# Patient Record
Sex: FEMALE | Race: WHITE | Employment: OTHER | URBAN - METROPOLITAN AREA
[De-identification: names, ages, dates, MRNs, and addresses within clinical notes are randomized per-mention and may not be internally consistent; named-entity substitution may affect disease eponyms.]

---

## 2017-04-21 RX ORDER — PANTOPRAZOLE SODIUM 40 MG/1
TABLET, DELAYED RELEASE ORAL
Qty: 30 TABLET | Refills: 5 | Status: SHIPPED | OUTPATIENT
Start: 2017-04-21 | End: 2017-08-17 | Stop reason: ALTCHOICE

## 2017-06-15 ENCOUNTER — TELEPHONE (OUTPATIENT)
Dept: INTERNAL MEDICINE CLINIC | Facility: CLINIC | Age: 72
End: 2017-06-15

## 2017-06-15 NOTE — TELEPHONE ENCOUNTER
Pt is due for colonoscopy. Referral is in Epic. Pt notified and states that she would like to see PCP first. AWV appt made for 7/5/17.

## 2017-06-30 PROBLEM — M85.80 OSTEOPENIA: Status: ACTIVE | Noted: 2017-06-30

## 2017-06-30 PROBLEM — R05.3 CHRONIC COUGH: Status: ACTIVE | Noted: 2017-06-30

## 2017-06-30 PROBLEM — F17.201 TOBACCO DEPENDENCE IN REMISSION: Status: ACTIVE | Noted: 2017-06-30

## 2017-06-30 PROBLEM — I77.1 TORTUOUS AORTA (HCC): Status: ACTIVE | Noted: 2017-06-30

## 2017-06-30 PROBLEM — I77.1 TORTUOUS AORTA: Status: ACTIVE | Noted: 2017-06-30

## 2017-07-05 ENCOUNTER — OFFICE VISIT (OUTPATIENT)
Dept: INTERNAL MEDICINE CLINIC | Facility: CLINIC | Age: 72
End: 2017-07-05

## 2017-07-05 VITALS
HEIGHT: 61 IN | SYSTOLIC BLOOD PRESSURE: 129 MMHG | WEIGHT: 175 LBS | DIASTOLIC BLOOD PRESSURE: 81 MMHG | HEART RATE: 73 BPM | BODY MASS INDEX: 33.04 KG/M2

## 2017-07-05 DIAGNOSIS — L57.0 ACTINIC KERATOSIS: ICD-10-CM

## 2017-07-05 DIAGNOSIS — Z12.31 SCREENING MAMMOGRAM, ENCOUNTER FOR: ICD-10-CM

## 2017-07-05 DIAGNOSIS — F17.201 TOBACCO DEPENDENCE IN REMISSION: ICD-10-CM

## 2017-07-05 DIAGNOSIS — E55.9 VITAMIN D DEFICIENCY: ICD-10-CM

## 2017-07-05 DIAGNOSIS — N60.19 DIFFUSE CYSTIC MASTOPATHY, UNSPECIFIED LATERALITY: ICD-10-CM

## 2017-07-05 DIAGNOSIS — I77.1 TORTUOUS AORTA (HCC): ICD-10-CM

## 2017-07-05 DIAGNOSIS — K21.9 GASTROESOPHAGEAL REFLUX DISEASE, ESOPHAGITIS PRESENCE NOT SPECIFIED: ICD-10-CM

## 2017-07-05 DIAGNOSIS — K58.9 IRRITABLE BOWEL SYNDROME WITHOUT DIARRHEA: ICD-10-CM

## 2017-07-05 DIAGNOSIS — I10 HYPERTENSION, BENIGN: ICD-10-CM

## 2017-07-05 DIAGNOSIS — M85.80 OSTEOPENIA, UNSPECIFIED LOCATION: ICD-10-CM

## 2017-07-05 DIAGNOSIS — M17.0 PRIMARY OSTEOARTHRITIS OF BOTH KNEES: ICD-10-CM

## 2017-07-05 DIAGNOSIS — J45.909 REACTIVE AIRWAY DISEASE WITHOUT COMPLICATION: ICD-10-CM

## 2017-07-05 DIAGNOSIS — L82.1 OTHER SEBORRHEIC KERATOSIS: ICD-10-CM

## 2017-07-05 DIAGNOSIS — Z00.00 ROUTINE GENERAL MEDICAL EXAMINATION AT A HEALTH CARE FACILITY: Primary | ICD-10-CM

## 2017-07-05 DIAGNOSIS — Z23 NEED FOR VACCINATION: ICD-10-CM

## 2017-07-05 DIAGNOSIS — Z00.00 ENCOUNTER FOR ANNUAL HEALTH EXAMINATION: ICD-10-CM

## 2017-07-05 LAB
ALBUMIN SERPL BCP-MCNC: 3.9 G/DL (ref 3.5–4.8)
ALBUMIN/GLOB SERPL: 1.6 {RATIO} (ref 1–2)
ALP SERPL-CCNC: 81 U/L (ref 32–100)
ALT SERPL-CCNC: 18 U/L (ref 14–54)
ANION GAP SERPL CALC-SCNC: 7 MMOL/L (ref 0–18)
AST SERPL-CCNC: 21 U/L (ref 15–41)
BILIRUB SERPL-MCNC: 0.5 MG/DL (ref 0.3–1.2)
BUN SERPL-MCNC: 14 MG/DL (ref 8–20)
BUN/CREAT SERPL: 19.4 (ref 10–20)
CALCIUM SERPL-MCNC: 9 MG/DL (ref 8.5–10.5)
CHLORIDE SERPL-SCNC: 106 MMOL/L (ref 95–110)
CHOLEST SERPL-MCNC: 207 MG/DL (ref 110–200)
CO2 SERPL-SCNC: 28 MMOL/L (ref 22–32)
CREAT SERPL-MCNC: 0.72 MG/DL (ref 0.5–1.5)
GLOBULIN PLAS-MCNC: 2.5 G/DL (ref 2.5–3.7)
GLUCOSE SERPL-MCNC: 97 MG/DL (ref 70–99)
HDLC SERPL-MCNC: 63 MG/DL
LDLC SERPL CALC-MCNC: 127 MG/DL (ref 0–99)
NONHDLC SERPL-MCNC: 144 MG/DL
OSMOLALITY UR CALC.SUM OF ELEC: 292 MOSM/KG (ref 275–295)
POTASSIUM SERPL-SCNC: 3.8 MMOL/L (ref 3.3–5.1)
PROT SERPL-MCNC: 6.4 G/DL (ref 5.9–8.4)
SODIUM SERPL-SCNC: 141 MMOL/L (ref 136–144)
TRIGL SERPL-MCNC: 83 MG/DL (ref 1–149)

## 2017-07-05 PROCEDURE — 36415 COLL VENOUS BLD VENIPUNCTURE: CPT | Performed by: INTERNAL MEDICINE

## 2017-07-05 PROCEDURE — 96160 PT-FOCUSED HLTH RISK ASSMT: CPT | Performed by: INTERNAL MEDICINE

## 2017-07-05 PROCEDURE — G0009 ADMIN PNEUMOCOCCAL VACCINE: HCPCS | Performed by: INTERNAL MEDICINE

## 2017-07-05 PROCEDURE — G0439 PPPS, SUBSEQ VISIT: HCPCS | Performed by: INTERNAL MEDICINE

## 2017-07-05 PROCEDURE — 90732 PPSV23 VACC 2 YRS+ SUBQ/IM: CPT | Performed by: INTERNAL MEDICINE

## 2017-07-05 RX ORDER — IBUPROFEN 800 MG
TABLET ORAL DAILY
COMMUNITY
End: 2018-09-15 | Stop reason: DRUGHIGH

## 2017-07-05 RX ORDER — MULTIVITAMIN WITH IRON
250 TABLET ORAL DAILY
Status: ON HOLD | COMMUNITY
End: 2019-09-12

## 2017-07-05 NOTE — PROGRESS NOTES
HPI:   Ashley Whittaker is a 73year old female who presents for a MA (Medicare Advantage) [address] [place] [address] (Once per calendar year).       Her last annual assessment has been over 1 year: Annual Physical due on 07/01/2017         Patient Care Team: Patient Care Te site; Postmenopausal bleeding; Torn meniscus; and Unspecified essential hypertension.     She  has a past surgical history that includes cholecystectomy; d&c after delivery; Breast biopsy (Right); other; tubal ligation; knee arthroscopy (Right); d & c (2008 Hearing Assessment  (Required for AWV/SWV)    Hearing Screening    Screening Method:  Questionnaire      I have a problem hearing over the telephone:  No I have trouble following the conversations when two or more people are talking at the same time:  No carotid bruit or JVD   Back:   Symmetric, no curvature, ROM normal, no CVA tenderness   Breast: Normal exam bilaterally, bilateral inverted nipples, no mass, + SBE   Lungs:   Clear to auscultation bilaterally, respirations unlabored   Heart:  Regular rate rechecked    Primary osteoarthritis of both knees   persistent knee pain particularly on the left, recommend that she follow-up with orthopedic  Osteopenia, unspecified location  Patient needs recheck of her bone density.   Encouraged her to continue calciu help    Taking medications as prescribed: Able without help    Are you able to afford your medications?: Yes    Hearing Problems?: No     Functional Status     Hearing Problems?: No    Vision Problems? : No    Difficulty walking?: No    Difficulty dressing LDL Annually LDL Cholesterol (mg/dL)   Date Value   09/22/2011 137 (H)        EKG - w/ Initial Preventative Physical Exam only, or if medically necessary Electrocardiogram date       Colorectal Cancer Screening      Colonoscopy Screen every 10 years Colono covered with a cut with metal- TD and TDaP Not covered by Medicare Part B No vaccine history found This may be covered with your prescription benefits, but Medicare does not cover unless Medically needed    Zoster  Not covered by Medicare Part B No vaccine

## 2017-07-05 NOTE — PATIENT INSTRUCTIONS
Lori Carroll's SCREENING SCHEDULE   Tests on this list are recommended by your physician but may not be covered, or covered at this frequency, by your insurer. Please check with your insurance carrier before scheduling to verify coverage.    PREVENTATIV more often if abnormal Colonoscopy,10 Years due on 05/29/2013 Update Health Maintenance if applicable    Flex Sigmoidoscopy Screen  Covered every 5 years No results found for this or any previous visit. No flowsheet data found.      Fecal Occult Blood   Cov (Pneumovax)  Covered Once after 65   Orders placed or performed in visit on 07/05/17  -PNEUMOCOCCAL IMM (PNEUMOVAX)    Please get once after your 65th birthday    Hepatitis B for Moderate/High Risk       No orders found for this or any previous visit.  Medi Lab or Procedure External Lab or Procedure   Diabetes Screening      HbgA1C    At Least  Annually for Diabetics No results found for: A1C No flowsheet data found.     Fasting Blood Sugar (FSB)   Patient must be diagnosed with one of the following:   • Hyper OCCULTSTOOL No flowsheet data found.      Barium Enema-   uncomfortable but covered  Covered but uncomfortable   Glaucoma Screening      Ophthalmology Visit   Covered annually for Diabetics, people with Glaucoma family history,   Americans over age 11 Hemophiliacs who received Factor VIII or IX concentrates   Clients of institutions for the mentally retarded   Persons who live in the same house as a HepB virus carrier   Homosexual men   Illicit injectable drug abusers     Tetanus Toxoid- Only covered

## 2017-07-07 LAB — 25(OH)D3 SERPL-MCNC: 24.5 NG/ML

## 2017-07-08 NOTE — PROGRESS NOTES
Labs normal, cholesterol better but the Vit D is a little low, needs to take additional 1000 units daily

## 2017-07-14 ENCOUNTER — HOSPITAL ENCOUNTER (OUTPATIENT)
Dept: BONE DENSITY | Age: 72
Discharge: HOME | End: 2017-07-14
Attending: INTERNAL MEDICINE
Payer: MEDICARE

## 2017-07-14 ENCOUNTER — HOSPITAL ENCOUNTER (OUTPATIENT)
Dept: MAMMOGRAPHY | Age: 72
Discharge: HOME | End: 2017-07-14
Attending: INTERNAL MEDICINE
Payer: MEDICARE

## 2017-07-14 DIAGNOSIS — M85.80 OSTEOPENIA, UNSPECIFIED LOCATION: ICD-10-CM

## 2017-07-14 DIAGNOSIS — Z12.31 SCREENING MAMMOGRAM, ENCOUNTER FOR: ICD-10-CM

## 2017-07-14 PROBLEM — M81.0 OSTEOPOROSIS, SENILE: Status: ACTIVE | Noted: 2017-07-14

## 2017-07-14 PROCEDURE — 77080 DXA BONE DENSITY AXIAL: CPT | Performed by: INTERNAL MEDICINE

## 2017-07-14 PROCEDURE — 77067 SCR MAMMO BI INCL CAD: CPT | Performed by: INTERNAL MEDICINE

## 2017-07-26 ENCOUNTER — OFFICE VISIT (OUTPATIENT)
Dept: INTERNAL MEDICINE CLINIC | Facility: CLINIC | Age: 72
End: 2017-07-26

## 2017-07-26 VITALS
HEIGHT: 61 IN | WEIGHT: 177 LBS | HEART RATE: 80 BPM | OXYGEN SATURATION: 95 % | TEMPERATURE: 98 F | BODY MASS INDEX: 33.42 KG/M2 | SYSTOLIC BLOOD PRESSURE: 144 MMHG | DIASTOLIC BLOOD PRESSURE: 79 MMHG

## 2017-07-26 DIAGNOSIS — T63.461A WASP STING, ACCIDENTAL OR UNINTENTIONAL, INITIAL ENCOUNTER: Primary | ICD-10-CM

## 2017-07-26 PROBLEM — Z91.030 BEE STING ALLERGY: Status: ACTIVE | Noted: 2017-07-26

## 2017-07-26 PROCEDURE — 99213 OFFICE O/P EST LOW 20 MIN: CPT | Performed by: INTERNAL MEDICINE

## 2017-07-26 PROCEDURE — G0463 HOSPITAL OUTPT CLINIC VISIT: HCPCS | Performed by: INTERNAL MEDICINE

## 2017-07-26 RX ORDER — EPINEPHRINE 0.3 MG/.3ML
0.3 INJECTION SUBCUTANEOUS ONCE
Qty: 1 EACH | Refills: 0 | Status: SHIPPED | OUTPATIENT
Start: 2017-07-26 | End: 2017-07-26

## 2017-07-26 RX ORDER — PREDNISONE 20 MG/1
40 TABLET ORAL DAILY
Qty: 6 TABLET | Refills: 0 | Status: SHIPPED | OUTPATIENT
Start: 2017-07-26 | End: 2017-07-29

## 2017-07-26 NOTE — PATIENT INSTRUCTIONS
ASSESSMENT/PLAN:   Diagnoses and all orders for this visit:    Wasp sting, accidental or unintentional, initial encounter    patient appears to have a significant reaction to the wasp sting.  Recommend short course of prednisone and should ice few times a d

## 2017-07-26 NOTE — PROGRESS NOTES
HPI:    Patient ID: Hemant Carpenter is a 73year old female. HPI  Wasp sting  Patient stung by wasp last night on her right finger. Had immediate swelling and erythema. No SOB or wheezing. She took benadryl which helped mildly.  Somewhat better today but

## 2017-08-17 ENCOUNTER — OFFICE VISIT (OUTPATIENT)
Dept: RHEUMATOLOGY | Facility: CLINIC | Age: 72
End: 2017-08-17

## 2017-08-17 VITALS
WEIGHT: 178.81 LBS | BODY MASS INDEX: 33.76 KG/M2 | DIASTOLIC BLOOD PRESSURE: 86 MMHG | HEIGHT: 61 IN | HEART RATE: 74 BPM | SYSTOLIC BLOOD PRESSURE: 154 MMHG

## 2017-08-17 DIAGNOSIS — E55.9 VITAMIN D DEFICIENCY: ICD-10-CM

## 2017-08-17 DIAGNOSIS — M81.0 OSTEOPOROSIS WITHOUT CURRENT PATHOLOGICAL FRACTURE, UNSPECIFIED OSTEOPOROSIS TYPE: Primary | ICD-10-CM

## 2017-08-17 PROCEDURE — 99204 OFFICE O/P NEW MOD 45 MIN: CPT | Performed by: INTERNAL MEDICINE

## 2017-08-17 PROCEDURE — G0463 HOSPITAL OUTPT CLINIC VISIT: HCPCS | Performed by: INTERNAL MEDICINE

## 2017-08-17 RX ORDER — ALENDRONATE SODIUM 70 MG/1
70 TABLET ORAL WEEKLY
Qty: 12 TABLET | Refills: 3 | Status: SHIPPED | OUTPATIENT
Start: 2017-08-17 | End: 2017-11-15

## 2017-08-17 RX ORDER — ERGOCALCIFEROL (VITAMIN D2) 1250 MCG
50000 CAPSULE ORAL WEEKLY
Qty: 12 CAPSULE | Refills: 0 | Status: SHIPPED | OUTPATIENT
Start: 2017-08-17 | End: 2017-11-15

## 2017-08-17 NOTE — PROGRESS NOTES
Migel Fillers is a 73year old female who presents for Patient presents with:  Osteoporosis  Osteoarthritis  . HPI:     I had the pleasure of seeing Migel Fillers on 8/17/2017 for evaluation.      She is a pleasant 73year old who has a hx of osteoprosi generalized or localized, unspecified site    • Osteoporosis, senile 7/14/2017   • Postmenopausal bleeding    • Torn meniscus    • Unspecified essential hypertension       Past Surgical History:  No date: APPENDECTOMY  No date: BREAST BIOPSY Right  No date DM  Joint/Muscluskeltal: see HPI, left knee arthritis noted. She broke her toe a long time ago, No other fractures   All other ROS are negative.      EXAM:   /86 (BP Location: Right arm, Patient Position: Sitting, Cuff Size: large)   Pulse 74   Ht 5 LEFT TOTAL HIP       BMD:  0.805 gm/sq. cm.    T SCORE: -1.1      Z SCORE: 0.0     PA LUMBAR SPINE (L1 - L4)       BMD:  0.966 gm/sq. cm.    T SCORE: -0.7      Z SCORE: 0.9    7/14/2017 DEXA  LEFT FEMORAL NECK                         BMD:              0. of copd but no steroids use. Summary:  1. Alendronate 70mg a week   - info on alendronate  2. Ergocalciferol 50,000units a week x 12 weeks.    3. Return to clinic in 1-2  Years or earlier depending on repsonse or if any side effects with alendronate   4

## 2017-08-17 NOTE — PATIENT INSTRUCTIONS
1. Alendronate 70mg a week   - info on alendronate  2. Ergocalciferol 50,000units a week x 12 weeks. 3. Return to clinic in 1-2  Years . 4. Check dexa scan in 2 years. 5. Cont.  Weight bearing exercises - like walking the dog -   Alendronate weekly ta Side effects that usually do not require medical attention (report to your doctor or health care professional if they continue or are bothersome):  · changes in taste  · diarrhea or constipation  · eye pain or itching  · headache  · nausea or vomiting  · s You should make sure you get enough calcium and vitamin D while you are taking this medicine, unless your doctor tells you not to.  Discuss the foods you eat and the vitamins you take with your health care professional.  Some people who take this medicine h · jaw pain, especially after dental work  · mouth sores  · muscle cramps, stiffness, or weakness  · redness, blistering, peeling or loosening of the skin, including inside the mouth  · trouble passing urine or change in the amount of urine  Side effects th Visit your doctor or health care professional for regular checkups. It may be some time before you see the benefit from this medicine. Do not stop taking your medicine unless your doctor tells you to.  Your doctor may order blood tests or other tests to see

## 2017-08-18 ENCOUNTER — TELEPHONE (OUTPATIENT)
Dept: INTERNAL MEDICINE CLINIC | Facility: CLINIC | Age: 72
End: 2017-08-18

## 2017-09-18 ENCOUNTER — TELEPHONE (OUTPATIENT)
Dept: INTERNAL MEDICINE CLINIC | Facility: CLINIC | Age: 72
End: 2017-09-18

## 2017-10-12 ENCOUNTER — TELEPHONE (OUTPATIENT)
Dept: INTERNAL MEDICINE CLINIC | Facility: CLINIC | Age: 72
End: 2017-10-12

## 2017-11-06 RX ORDER — PANTOPRAZOLE SODIUM 40 MG/1
TABLET, DELAYED RELEASE ORAL
Qty: 30 TABLET | Refills: 3 | Status: SHIPPED | OUTPATIENT
Start: 2017-11-06 | End: 2018-06-08

## 2017-11-14 ENCOUNTER — TELEPHONE (OUTPATIENT)
Dept: RHEUMATOLOGY | Facility: CLINIC | Age: 72
End: 2017-11-14

## 2017-11-14 DIAGNOSIS — E55.9 VITAMIN D DEFICIENCY: Primary | ICD-10-CM

## 2018-02-26 ENCOUNTER — PATIENT OUTREACH (OUTPATIENT)
Dept: INTERNAL MEDICINE CLINIC | Facility: CLINIC | Age: 73
End: 2018-02-26

## 2018-02-26 NOTE — PROGRESS NOTES
Patient is eligible for a 2018 Annual Medicare Health Assessment. Discussed in detail w/patient. Appt scheduled for 3/5/18.

## 2018-04-25 ENCOUNTER — OFFICE VISIT (OUTPATIENT)
Dept: OPHTHALMOLOGY | Facility: CLINIC | Age: 73
End: 2018-04-25

## 2018-04-25 DIAGNOSIS — H52.4 MYOPIA OF BOTH EYES WITH ASTIGMATISM AND PRESBYOPIA: ICD-10-CM

## 2018-04-25 DIAGNOSIS — H25.13 AGE-RELATED NUCLEAR CATARACT OF BOTH EYES: Primary | ICD-10-CM

## 2018-04-25 DIAGNOSIS — H52.203 MYOPIA OF BOTH EYES WITH ASTIGMATISM AND PRESBYOPIA: ICD-10-CM

## 2018-04-25 DIAGNOSIS — H43.393 VITREOUS FLOATERS OF BOTH EYES: ICD-10-CM

## 2018-04-25 DIAGNOSIS — H52.13 MYOPIA OF BOTH EYES WITH ASTIGMATISM AND PRESBYOPIA: ICD-10-CM

## 2018-04-25 PROCEDURE — 92015 DETERMINE REFRACTIVE STATE: CPT | Performed by: OPHTHALMOLOGY

## 2018-04-25 PROCEDURE — 92004 COMPRE OPH EXAM NEW PT 1/>: CPT | Performed by: OPHTHALMOLOGY

## 2018-04-25 NOTE — PROGRESS NOTES
Jessica Keating is a 81year old female. HPI:     HPI     New patient here for a complete exam.  Patient denies any ocular complaints, but she would like an updated Rx today. Her last eye exam was about 3 years ago.   Patient's  is a patient of  mouth daily. Disp:  Rfl:    Fluticasone Propionate HFA (FLOVENT HFA) 110 MCG/ACT Inhalation Aerosol Inhale 2 puffs into the lungs 2 (two) times daily.  (Patient taking differently: Inhale 2 puffs into the lungs as needed.  ) Disp: 1 Inhaler Rfl: 3       A 20/25    Left -9.25 +1.00 020 20/30 +2.75 20/25    Type:  Progressive bifocal                 ASSESSMENT/PLAN:     Diagnoses and Plan:     Age-related nuclear cataract of both eyes   Discussed diagnosis of cataracts in detail with patient.   Cataract surger

## 2018-04-25 NOTE — ASSESSMENT & PLAN NOTE
Discussed diagnosis of cataracts in detail with patient. Cataract surgery not indicated at this time due to vision level. Will continue to monitor yearly. Patient will call sooner than 1 year recall if they notice a change in vision.     New glasses tod

## 2018-04-25 NOTE — PATIENT INSTRUCTIONS
Age-related nuclear cataract of both eyes   Discussed diagnosis of cataracts in detail with patient. Cataract surgery not indicated at this time due to vision level. Will continue to monitor yearly.   Patient will call sooner than 1 year recall if they no that aren’t really there? A few of these flashes are seen by everyone from time to time. Usually you see them in one eye at a time.  Flashes are often caused by the gel filling inside of your eye, called the vitreous, pulling on the retina. The retina is a

## 2018-06-08 ENCOUNTER — OFFICE VISIT (OUTPATIENT)
Dept: INTERNAL MEDICINE CLINIC | Facility: CLINIC | Age: 73
End: 2018-06-08

## 2018-06-08 VITALS
BODY MASS INDEX: 34.74 KG/M2 | TEMPERATURE: 98 F | SYSTOLIC BLOOD PRESSURE: 135 MMHG | DIASTOLIC BLOOD PRESSURE: 84 MMHG | HEART RATE: 79 BPM | WEIGHT: 184 LBS | HEIGHT: 61 IN | OXYGEN SATURATION: 97 %

## 2018-06-08 DIAGNOSIS — I10 HYPERTENSION, BENIGN: Primary | ICD-10-CM

## 2018-06-08 DIAGNOSIS — M81.0 OSTEOPOROSIS, SENILE: ICD-10-CM

## 2018-06-08 DIAGNOSIS — J45.20 MILD INTERMITTENT REACTIVE AIRWAY DISEASE WITHOUT COMPLICATION: ICD-10-CM

## 2018-06-08 PROCEDURE — 99213 OFFICE O/P EST LOW 20 MIN: CPT | Performed by: INTERNAL MEDICINE

## 2018-06-08 PROCEDURE — 99212 OFFICE O/P EST SF 10 MIN: CPT | Performed by: INTERNAL MEDICINE

## 2018-06-08 RX ORDER — ALENDRONATE SODIUM 70 MG/1
70 TABLET ORAL WEEKLY
COMMUNITY
End: 2019-08-06

## 2018-06-08 RX ORDER — ACETAMINOPHEN 160 MG
2000 TABLET,DISINTEGRATING ORAL DAILY
Status: ON HOLD | COMMUNITY
End: 2019-09-12

## 2018-06-08 NOTE — PATIENT INSTRUCTIONS
ASSESSMENT/PLAN:   Hypertension, benign  BP overall doing very well continue regular exercise, same meds    Reactive airway disease without complication  This seems to be bothered only when she is mowing lawn.  Recommend that she use claritin or zyrtec prio

## 2018-06-08 NOTE — ASSESSMENT & PLAN NOTE
This seems to be bothered only when she is mowing lawn.  Recommend that she use claritin or zyrtec prior to mowing her lawn

## 2018-06-08 NOTE — PROGRESS NOTES
HPI:    Patient ID: Kassi Mckeon is a 81year old female. HPI  Hypertension  Patient is here for follow up of hypertension. BP at home: doing well Taking no medications  Exercise level: walking dog 3 times daily and has been following low salt diet. well-developed and well-nourished. HENT:   Head: Normocephalic and atraumatic. Cardiovascular: Normal rate and regular rhythm. Edema not present.   Pulmonary/Chest: Effort normal and breath sounds normal.           ASSESSMENT/PLAN:   Hypertension, b

## 2018-08-31 ENCOUNTER — OFFICE VISIT (OUTPATIENT)
Dept: INTERNAL MEDICINE CLINIC | Facility: CLINIC | Age: 73
End: 2018-08-31
Payer: COMMERCIAL

## 2018-08-31 ENCOUNTER — TELEPHONE (OUTPATIENT)
Dept: INTERNAL MEDICINE CLINIC | Facility: CLINIC | Age: 73
End: 2018-08-31

## 2018-08-31 VITALS
WEIGHT: 180 LBS | TEMPERATURE: 98 F | OXYGEN SATURATION: 98 % | HEART RATE: 74 BPM | BODY MASS INDEX: 34 KG/M2 | SYSTOLIC BLOOD PRESSURE: 131 MMHG | DIASTOLIC BLOOD PRESSURE: 82 MMHG

## 2018-08-31 DIAGNOSIS — M79.675 PAIN OF TOE OF LEFT FOOT: Primary | ICD-10-CM

## 2018-08-31 PROCEDURE — 99213 OFFICE O/P EST LOW 20 MIN: CPT | Performed by: INTERNAL MEDICINE

## 2018-08-31 NOTE — TELEPHONE ENCOUNTER
Received call from patient, stated that she tripped and fell 8 weeks ago and hurt left big toe. Per patient since then she had swelling to toe but did not think it was broken cause she could move the toe.  She also had fungal infection to same toe and treat

## 2018-08-31 NOTE — PROGRESS NOTES
HPI:    Patient ID: Manisha Rios is a 81year old female. HPI  Toe pain  Patient stubbed her toe 6/12 and had significant pain to the area. Has had pain for last 2 months. No better, no worse.  She also has fungal infection of her toe and the nail is r

## 2018-09-11 ENCOUNTER — TELEPHONE (OUTPATIENT)
Dept: CASE MANAGEMENT | Age: 73
End: 2018-09-11

## 2018-09-11 DIAGNOSIS — Z12.11 COLON CANCER SCREENING: Primary | ICD-10-CM

## 2018-09-11 NOTE — TELEPHONE ENCOUNTER
LOV 8/31/18  Patient is due for colorectal screening. (Dx: Z12.11)  Please order FIT or colonoscopy if appropriate. Thank you.

## 2018-09-13 NOTE — TELEPHONE ENCOUNTER
Patient stated Sept/Oct not good, but will have it done by the end of the year. Can you put in a referral to Dr. Clare Lerner or Dr. Amor Pederson for her.

## 2018-09-14 ENCOUNTER — TELEPHONE (OUTPATIENT)
Dept: INTERNAL MEDICINE CLINIC | Facility: CLINIC | Age: 73
End: 2018-09-14

## 2018-09-14 NOTE — TELEPHONE ENCOUNTER
Patient was stung in left side of neck 9/13 while driving. Left side of neck is red swollen and painful  Used epi pen yesterday and took benadryl   No difficulty swallowing or breathing. Anything you can suggest to put on for swelling?

## 2018-09-14 NOTE — TELEPHONE ENCOUNTER
Patient notified to apply ice to area.    Asking to be seen tomorrow   [place] Hidden 11:45 am on 9/15/18 Dr. Marcelle Fleming last patient ok per Dr. Roxie Tinoco

## 2018-09-15 ENCOUNTER — OFFICE VISIT (OUTPATIENT)
Dept: INTERNAL MEDICINE CLINIC | Facility: CLINIC | Age: 73
End: 2018-09-15
Payer: COMMERCIAL

## 2018-09-15 VITALS
BODY MASS INDEX: 33.79 KG/M2 | SYSTOLIC BLOOD PRESSURE: 126 MMHG | TEMPERATURE: 98 F | HEIGHT: 61 IN | WEIGHT: 179 LBS | OXYGEN SATURATION: 97 % | DIASTOLIC BLOOD PRESSURE: 84 MMHG | HEART RATE: 80 BPM

## 2018-09-15 DIAGNOSIS — I77.1 TORTUOUS AORTA (HCC): ICD-10-CM

## 2018-09-15 DIAGNOSIS — R21 RASH: ICD-10-CM

## 2018-09-15 DIAGNOSIS — I10 HYPERTENSION, BENIGN: Primary | ICD-10-CM

## 2018-09-15 DIAGNOSIS — T63.461A WASP STING, ACCIDENTAL OR UNINTENTIONAL, INITIAL ENCOUNTER: ICD-10-CM

## 2018-09-15 PROCEDURE — 99214 OFFICE O/P EST MOD 30 MIN: CPT | Performed by: INTERNAL MEDICINE

## 2018-09-15 RX ORDER — RANITIDINE 150 MG/1
150 TABLET ORAL 2 TIMES DAILY
Qty: 10 TABLET | Refills: 0 | Status: SHIPPED | OUTPATIENT
Start: 2018-09-15 | End: 2019-03-27 | Stop reason: ALTCHOICE

## 2018-09-15 RX ORDER — EPINEPHRINE 0.3 MG/.3ML
0.3 INJECTION SUBCUTANEOUS ONCE
Qty: 1 EACH | Refills: 0 | Status: SHIPPED | OUTPATIENT
Start: 2018-09-15 | End: 2018-09-15

## 2018-09-15 RX ORDER — METHYLPREDNISOLONE 4 MG/1
TABLET ORAL
Qty: 1 KIT | Refills: 0 | Status: SHIPPED | OUTPATIENT
Start: 2018-09-15 | End: 2019-03-27 | Stop reason: ALTCHOICE

## 2018-09-15 NOTE — PATIENT INSTRUCTIONS
ASSESSMENT/PLAN:   Hypertension, benign  (primary encounter diagnosis) Good control. Careful with diet and excercise at least 30 minutes 3-4 times a week. Check blood pressures at different times on different days. Can purchase own blood pressure monitor.

## 2018-09-15 NOTE — PROGRESS NOTES
HPI:    Patient ID: Caty Yates is a 81year old female. HPI  Stung by wasp in 2 days ago. L side neck and swollen. Took benadryl. Decreased. Maybe colonoscopy 12-18? Too busy at work to schedule.      Exercise level: moderately active and has bee Endocrine: Negative for cold intolerance, heat intolerance, polydipsia, polyphagia and polyuria. Genitourinary: Negative for dysuria. Musculoskeletal: Negative for neck stiffness. Skin: Negative for rash.    Allergic/Immunologic: Negative for environm 2001: LÁZARO NEEDLE LOCALIZATION W/ SPECIMEN 1 SITE RIGHT  No date: OTHER      Comment:  excision of left breast plate  No date: TUBAL LIGATION   Family History   Adopted: Yes   Problem Relation Age of Onset   • Cancer Brother         Lung   • Dementia Other Mouth/Throat: Uvula is midline, oropharynx is clear and moist and mucous membranes are normal. Mucous membranes are not pale, not dry and not cyanotic. She does not have dentures. No oral lesions. No trismus in the jaw.  No dental abscesses, uvula swelling Right cervical: No superficial cervical, no deep cervical and no posterior cervical adenopathy present. Left cervical: No superficial cervical, no deep cervical and no posterior cervical adenopathy present.         Right: No supraclavicular adeno

## 2018-09-18 ENCOUNTER — OFFICE VISIT (OUTPATIENT)
Dept: PODIATRY CLINIC | Facility: CLINIC | Age: 73
End: 2018-09-18
Payer: COMMERCIAL

## 2018-09-18 DIAGNOSIS — B35.1 ONYCHOMYCOSIS: ICD-10-CM

## 2018-09-18 DIAGNOSIS — M79.675 PAIN OF TOE OF LEFT FOOT: Primary | ICD-10-CM

## 2018-09-18 PROCEDURE — 11720 DEBRIDE NAIL 1-5: CPT | Performed by: PODIATRIST

## 2018-09-18 PROCEDURE — 99202 OFFICE O/P NEW SF 15 MIN: CPT | Performed by: PODIATRIST

## 2018-09-18 NOTE — PROGRESS NOTES
HPI:    Patient ID: Eusebia Chu is a 72year old female. HPI  This pleasant 16-year-old female presents as a new patient to me on referral from [address] [place] [address]. Patient's primary concern is the left great toenail.   She injured it a couple months ago an well-informed and indicates an understanding follow-up as needed         ASSESSMENT/PLAN:   Pain of toe of left foot  (primary encounter diagnosis)  Onychomycosis    No orders of the defined types were placed in this encounter.       Meds This Visit:  Reque

## 2018-12-05 ENCOUNTER — PATIENT OUTREACH (OUTPATIENT)
Dept: CASE MANAGEMENT | Age: 73
End: 2018-12-05

## 2018-12-05 NOTE — PROGRESS NOTES
Outreached to patient in regards to scheduling Medicare Annual exam (AHA). Patient scheduled her appt with her PCP for February 19, 2019. Thank you.

## 2019-02-12 ENCOUNTER — MA CHART PREP (OUTPATIENT)
Dept: FAMILY MEDICINE CLINIC | Facility: CLINIC | Age: 74
End: 2019-02-12

## 2019-03-26 ENCOUNTER — WALK IN (OUTPATIENT)
Dept: URGENT CARE | Age: 74
End: 2019-03-26

## 2019-03-26 VITALS
TEMPERATURE: 98 F | RESPIRATION RATE: 20 BRPM | DIASTOLIC BLOOD PRESSURE: 80 MMHG | SYSTOLIC BLOOD PRESSURE: 120 MMHG | HEART RATE: 79 BPM | OXYGEN SATURATION: 96 % | WEIGHT: 170.65 LBS | HEIGHT: 60 IN | BODY MASS INDEX: 33.51 KG/M2

## 2019-03-26 DIAGNOSIS — B02.8 HERPES ZOSTER WITH COMPLICATION: Primary | ICD-10-CM

## 2019-03-26 PROCEDURE — 99203 OFFICE O/P NEW LOW 30 MIN: CPT | Performed by: NURSE PRACTITIONER

## 2019-03-26 RX ORDER — FAMCICLOVIR 500 MG/1
500 TABLET ORAL 3 TIMES DAILY
Qty: 21 TABLET | Refills: 0 | Status: SHIPPED | OUTPATIENT
Start: 2019-03-26 | End: 2019-04-02

## 2019-03-26 ASSESSMENT — ENCOUNTER SYMPTOMS
NEUROLOGICAL NEGATIVE: 1
EYES NEGATIVE: 1
RESPIRATORY NEGATIVE: 1
HEMATOLOGIC/LYMPHATIC NEGATIVE: 1
PSYCHIATRIC NEGATIVE: 1
ALLERGIC/IMMUNOLOGIC NEGATIVE: 1
CONSTITUTIONAL NEGATIVE: 1
GASTROINTESTINAL NEGATIVE: 1
ENDOCRINE NEGATIVE: 1

## 2019-03-27 ENCOUNTER — OFFICE VISIT (OUTPATIENT)
Dept: INTERNAL MEDICINE CLINIC | Facility: CLINIC | Age: 74
End: 2019-03-27
Payer: COMMERCIAL

## 2019-03-27 VITALS
WEIGHT: 169 LBS | HEIGHT: 61 IN | BODY MASS INDEX: 31.91 KG/M2 | HEART RATE: 89 BPM | SYSTOLIC BLOOD PRESSURE: 127 MMHG | DIASTOLIC BLOOD PRESSURE: 83 MMHG

## 2019-03-27 DIAGNOSIS — B02.9 HERPES ZOSTER WITHOUT COMPLICATION: Primary | ICD-10-CM

## 2019-03-27 DIAGNOSIS — M54.31 SCIATICA OF RIGHT SIDE: ICD-10-CM

## 2019-03-27 PROCEDURE — 99213 OFFICE O/P EST LOW 20 MIN: CPT | Performed by: INTERNAL MEDICINE

## 2019-03-27 RX ORDER — GABAPENTIN 100 MG/1
100 CAPSULE ORAL 2 TIMES DAILY
Qty: 60 CAPSULE | Refills: 0 | Status: ON HOLD | OUTPATIENT
Start: 2019-03-27 | End: 2019-04-05

## 2019-03-27 RX ORDER — FAMCICLOVIR 500 MG/1
500 TABLET, FILM COATED ORAL 3 TIMES DAILY
Status: ON HOLD | COMMUNITY
Start: 2019-03-26 | End: 2019-04-05

## 2019-03-27 NOTE — PROGRESS NOTES
Anjali Smith is a 72year old female. Patient presents with:  Back Pain: R-side, starts at buttock and runs down to leg, onset one week ago  Rash: R-thigh, taking famciclovir    HPI:   65-year-old lady here for evaluation of right-sided back pain had a ra Use      Smoking status: Former Smoker        Types: Cigarettes      Smokeless tobacco: Never Used      Tobacco comment: occasional cigarette with friends in highschool    Alcohol use:  Yes      Alcohol/week: 0.0 oz      Comment: occasional wine    Drug use subsiding   Neurological: She is alert and oriented to person, place, and time. Psychiatric: Her behavior is normal.         ASSESSMENT AND PLAN:   1. Herpes zoster without complication  Continue with famciclovir. Discussed precautions    2.  Sciatica of

## 2019-03-27 NOTE — PATIENT INSTRUCTIONS
As we discussed, the rash in the right lower extremity is herpes zoster. It may become painful the next 24-48 hours. He can take the medicine I gave you. If your pain did not get better with the medicine, please give me a call back.   The discomfort in t

## 2019-04-01 ENCOUNTER — TELEPHONE (OUTPATIENT)
Dept: INTERNAL MEDICINE CLINIC | Facility: CLINIC | Age: 74
End: 2019-04-01

## 2019-04-01 NOTE — TELEPHONE ENCOUNTER
Please let pat take 2 tabs of gabapentin in Am and in PM . Please let pat know Thanks    Lanie Omalley

## 2019-04-02 ENCOUNTER — HOSPITAL ENCOUNTER (OUTPATIENT)
Facility: HOSPITAL | Age: 74
Setting detail: OBSERVATION
Discharge: HOME | End: 2019-04-05
Attending: EMERGENCY MEDICINE | Admitting: INTERNAL MEDICINE
Payer: MEDICARE

## 2019-04-02 ENCOUNTER — APPOINTMENT (OUTPATIENT)
Dept: CT IMAGING | Facility: HOSPITAL | Age: 74
End: 2019-04-02
Attending: EMERGENCY MEDICINE
Payer: MEDICARE

## 2019-04-02 ENCOUNTER — APPOINTMENT (OUTPATIENT)
Dept: MRI IMAGING | Facility: HOSPITAL | Age: 74
End: 2019-04-02
Attending: EMERGENCY MEDICINE
Payer: MEDICARE

## 2019-04-02 DIAGNOSIS — R10.13 ABDOMINAL PAIN, EPIGASTRIC: Primary | ICD-10-CM

## 2019-04-02 DIAGNOSIS — R00.0 TACHYCARDIA: ICD-10-CM

## 2019-04-02 DIAGNOSIS — K83.9 BILE DUCT ABNORMALITY: ICD-10-CM

## 2019-04-02 PROBLEM — R11.2 NON-INTRACTABLE VOMITING WITH NAUSEA: Status: ACTIVE | Noted: 2019-04-02

## 2019-04-02 PROBLEM — R10.11 RIGHT UPPER QUADRANT ABDOMINAL PAIN: Status: ACTIVE | Noted: 2019-04-02

## 2019-04-02 PROBLEM — Z86.19 HISTORY OF SHINGLES: Status: ACTIVE | Noted: 2019-04-02

## 2019-04-02 PROCEDURE — 74181 MRI ABDOMEN W/O CONTRAST: CPT | Performed by: EMERGENCY MEDICINE

## 2019-04-02 PROCEDURE — 76376 3D RENDER W/INTRP POSTPROCES: CPT | Performed by: EMERGENCY MEDICINE

## 2019-04-02 PROCEDURE — 99220 INITIAL OBSERVATION CARE,LEVL III: CPT | Performed by: INTERNAL MEDICINE

## 2019-04-02 PROCEDURE — 74177 CT ABD & PELVIS W/CONTRAST: CPT | Performed by: EMERGENCY MEDICINE

## 2019-04-02 RX ORDER — GABAPENTIN 100 MG/1
100 CAPSULE ORAL 2 TIMES DAILY
Status: DISCONTINUED | OUTPATIENT
Start: 2019-04-02 | End: 2019-04-04

## 2019-04-02 RX ORDER — ONDANSETRON 2 MG/ML
4 INJECTION INTRAMUSCULAR; INTRAVENOUS EVERY 6 HOURS PRN
Status: DISCONTINUED | OUTPATIENT
Start: 2019-04-02 | End: 2019-04-05

## 2019-04-02 RX ORDER — MAGNESIUM OXIDE 400 MG (241.3 MG MAGNESIUM) TABLET
800 TABLET DAILY
Status: DISCONTINUED | OUTPATIENT
Start: 2019-04-02 | End: 2019-04-05

## 2019-04-02 RX ORDER — MORPHINE SULFATE 4 MG/ML
4 INJECTION, SOLUTION INTRAMUSCULAR; INTRAVENOUS ONCE
Status: COMPLETED | OUTPATIENT
Start: 2019-04-02 | End: 2019-04-02

## 2019-04-02 RX ORDER — SODIUM CHLORIDE 9 MG/ML
INJECTION, SOLUTION INTRAVENOUS ONCE
Status: COMPLETED | OUTPATIENT
Start: 2019-04-02 | End: 2019-04-02

## 2019-04-02 RX ORDER — KETOROLAC TROMETHAMINE 15 MG/ML
15 INJECTION, SOLUTION INTRAMUSCULAR; INTRAVENOUS ONCE
Status: COMPLETED | OUTPATIENT
Start: 2019-04-02 | End: 2019-04-02

## 2019-04-02 RX ORDER — ONDANSETRON 2 MG/ML
4 INJECTION INTRAMUSCULAR; INTRAVENOUS ONCE
Status: COMPLETED | OUTPATIENT
Start: 2019-04-02 | End: 2019-04-02

## 2019-04-02 RX ORDER — MORPHINE SULFATE 2 MG/ML
2 INJECTION, SOLUTION INTRAMUSCULAR; INTRAVENOUS EVERY 4 HOURS PRN
Status: DISCONTINUED | OUTPATIENT
Start: 2019-04-02 | End: 2019-04-05

## 2019-04-02 NOTE — H&P
[facility]    History and Physical    Duong Moffett Patient Status:  Observation    1945 MRN [id number]   Location [facility]5W Attending Roxanne Lemus MD   Hosp Day # 0 PCP Saw Rodriguez MD     Date:  2019  Alexandru 2009   • Extrinsic asthma, unspecified    • Hiatal hernia    • Osteoarthrosis, unspecified whether generalized or localized, unspecified site    • Osteoporosis, senile 7/14/2017   • Postmenopausal bleeding    • Torn meniscus    • Unspecified essential hype vomiting, abdominal pain and diarrhea. Now resolved    Endocrine: Negative. Genitourinary: Negative. Musculoskeletal: Negative. Skin: Positive for rash. Rt thigh/groing area    Neurological: Negative.     Psychiatric/Behavioral: Negat 13 mm.  No choledocholithiasis. The appearance suggest chronic age related and post cholecystectomy change. 2.  Hepatic steatosis. 3.  Moderate-sized hiatal hernia.   Dictated by (CST): Divya Vasquez MD on 4/02/2019 at 17:35     Approved by (CST): Kayode Simms

## 2019-04-02 NOTE — ED NOTES
Patient states she has not been able to keep food or water down and also complaining of right flank pain.

## 2019-04-02 NOTE — ED PROVIDER NOTES
Patient Seen in: [facility] AND [facility] Emergency Department    History   Patient presents with:  Nausea/Vomiting/Diarrhea (gastrointestinal)    Stated Complaint: N/V/D    HPI    68-year-old female presents for right flank pain since yesterday.   She is had a oz      Comment: occasional wine    Drug use: No      Review of Systems   Constitutional: Negative. HENT: Negative. Eyes: Negative. Respiratory: Negative. Cardiovascular: Negative.     Gastrointestinal: Positive for abdominal pain, diarrhea, alphonso ED Course     Labs Reviewed   BASIC METABOLIC PANEL (8) - Abnormal; Notable for the following components:       Result Value    Glucose 112 (*)     Chloride 108 (*)     BUN/CREA Ratio 23.9 (*)     All other components within normal limits   URINALYSIS vomiting and diarrhea with lower abdominal pain for two weeks. TECHNIQUE: CT images of the abdomen and pelvis were obtained with non-ionic intravenous contrast material.  Automated exposure control for dose reduction was used.  Adjustment of the mA and/or bilateral hips. LUNG BASES: There is bibasilar and lingular atelectasis and scarring. There are coronary artery calcifications. CONCLUSION:   Diverticulosis without diverticulitis.   Postoperative changes of a prior fundoplication with a small recur

## 2019-04-02 NOTE — ED INITIAL ASSESSMENT (HPI)
C/O N/V/D for a few days. Also c/o rash on thigh which was diagnosed as shingles. Pt was told to take two pain pills which started the vomiting. Pain to lower abd.

## 2019-04-02 NOTE — CONSULTS
[facility] - [facility]    Report of Consultation    Dereck Diaz Patient Status:  Emergency    1945 MRN [id number]   Location [address] Attending [address] [place] Day # 0 PCP Prosper Monae.  Carlos Clifton MD unspecified    • Hiatal hernia    • Osteoarthrosis, unspecified whether generalized or localized, unspecified site    • Osteoporosis, senile 7/14/2017   • Postmenopausal bleeding    • Torn meniscus    • Unspecified essential hypertension        Past Surgic intolerance    Physical Exam:   Blood pressure 130/71, pulse 112, temperature 98.2 °F (36.8 °C), temperature source Oral, resp. rate 18, height 5' (1.524 m), weight 169 lb (76.7 kg), SpO2 95 %, not currently breastfeeding.   GENERAL: well developed, in no a patient's size. Use of iterative reconstruction technique for dose reduction was used. FINDINGS:  LIVER: Unremarkable without focal mass. GALLBLADDER: The patient is post cholecystectomy.  BILIARY: There is mild dilation of the common bile duct measuring containing the gastroesophageal junction and gastric fundus. Mild dilation of the common bile duct and intrahepatic biliary ducts has progressed since 3/19/2009. Correlation with bilirubin and LFTs suggested.   Nonemergent MRCP could further assess if ther

## 2019-04-02 NOTE — PLAN OF CARE
Problem: Patient Centered Care  Goal: Patient preferences are identified and integrated in the patient's plan of care  Interventions:  - What would you like us to know as we care for you? \"my  was just here.   I take care of him\"  - Provide timely, system  Outcome: Progressing      Problem: SAFETY ADULT - FALL  Goal: Free from fall injury  INTERVENTIONS:  - Assess pt frequently for physical needs  - Identify cognitive and physical deficits and behaviors that affect risk of falls.   - [place] fall pr

## 2019-04-03 ENCOUNTER — ANESTHESIA (OUTPATIENT)
Dept: ENDOSCOPY | Facility: HOSPITAL | Age: 74
End: 2019-04-03
Payer: MEDICARE

## 2019-04-03 ENCOUNTER — ANESTHESIA EVENT (OUTPATIENT)
Dept: ENDOSCOPY | Facility: HOSPITAL | Age: 74
End: 2019-04-03
Payer: MEDICARE

## 2019-04-03 PROCEDURE — 99226 SUBSEQUENT OBSERVATION CARE: CPT | Performed by: INTERNAL MEDICINE

## 2019-04-03 PROCEDURE — 0DJ08ZZ INSPECTION OF UPPER INTESTINAL TRACT, VIA NATURAL OR ARTIFICIAL OPENING ENDOSCOPIC: ICD-10-PCS | Performed by: INTERNAL MEDICINE

## 2019-04-03 RX ORDER — SODIUM CHLORIDE, SODIUM LACTATE, POTASSIUM CHLORIDE, CALCIUM CHLORIDE 600; 310; 30; 20 MG/100ML; MG/100ML; MG/100ML; MG/100ML
INJECTION, SOLUTION INTRAVENOUS CONTINUOUS PRN
Status: DISCONTINUED | OUTPATIENT
Start: 2019-04-03 | End: 2019-04-03 | Stop reason: SURG

## 2019-04-03 RX ORDER — SODIUM CHLORIDE, SODIUM LACTATE, POTASSIUM CHLORIDE, CALCIUM CHLORIDE 600; 310; 30; 20 MG/100ML; MG/100ML; MG/100ML; MG/100ML
INJECTION, SOLUTION INTRAVENOUS CONTINUOUS
Status: DISCONTINUED | OUTPATIENT
Start: 2019-04-03 | End: 2019-04-05

## 2019-04-03 RX ORDER — LIDOCAINE HYDROCHLORIDE 10 MG/ML
INJECTION, SOLUTION EPIDURAL; INFILTRATION; INTRACAUDAL; PERINEURAL AS NEEDED
Status: DISCONTINUED | OUTPATIENT
Start: 2019-04-03 | End: 2019-04-03 | Stop reason: SURG

## 2019-04-03 RX ORDER — NALOXONE HYDROCHLORIDE 0.4 MG/ML
80 INJECTION, SOLUTION INTRAMUSCULAR; INTRAVENOUS; SUBCUTANEOUS AS NEEDED
Status: DISCONTINUED | OUTPATIENT
Start: 2019-04-03 | End: 2019-04-03 | Stop reason: HOSPADM

## 2019-04-03 RX ORDER — ROCURONIUM BROMIDE 10 MG/ML
INJECTION, SOLUTION INTRAVENOUS AS NEEDED
Status: DISCONTINUED | OUTPATIENT
Start: 2019-04-03 | End: 2019-04-03 | Stop reason: SURG

## 2019-04-03 RX ADMIN — SODIUM CHLORIDE, SODIUM LACTATE, POTASSIUM CHLORIDE, CALCIUM CHLORIDE: 600; 310; 30; 20 INJECTION, SOLUTION INTRAVENOUS at 17:03:00

## 2019-04-03 RX ADMIN — ROCURONIUM BROMIDE 10 MG: 10 INJECTION, SOLUTION INTRAVENOUS at 17:05:00

## 2019-04-03 RX ADMIN — LIDOCAINE HYDROCHLORIDE 50 MG: 10 INJECTION, SOLUTION EPIDURAL; INFILTRATION; INTRACAUDAL; PERINEURAL at 17:05:00

## 2019-04-03 NOTE — ANESTHESIA POSTPROCEDURE EVALUATION
Patient: Med Adames    Procedure Summary     Date:  04/03/19 Room / Location:  [address] [place] [address] ENDOSCOPY 01 / [address] [place] [address] ENDOSCOPY    Anesthesia Start:  3160 Anesthesia Stop:      Procedure:  ENDOSCOPIC ULTRASOUND (EUS) (N/A ) Diagnosis:  (no bilse duct stones)    Urbano Delaney

## 2019-04-03 NOTE — PROGRESS NOTES
[facility]    Progress Note    Roberto Hughes Patient Status:  Observation    1945 MRN [id number]   Location [facility]5W Attending Irais Mendoza MD   Hosp Day # 0 PCP Marilee Marinelli MD        Subjective:     HENT: done with treatment           Tachycardia-resolved               Results:     Lab Results   Component Value Date    WBC 4.9 04/03/2019    HGB 12.9 04/03/2019    HCT 41.4 04/03/2019    .0 04/03/2019    CREATSERUM 0.71 04/02/2019    BUN 17 04/02/2019 inpatient services that will reasonably be expected to span two midnight's based on the clinical documentation in H+P. Based on patients current state of illness, I anticipate that, after discharge, patient will require TBD.       Agustin Foster MD  4/3/201

## 2019-04-03 NOTE — PROGRESS NOTES
[facility] AND [facility]  Gastroenterology Progress Note    Richy Gore Patient Status:  Observation    1945 MRN [id number]   Location [facility]W Attending Yves Townsend MD   Hosp Day # 0 PCP Cheng Terrazas MD     Subjective:   Belinda Ahuja without diverticulitis. Postoperative changes of a prior fundoplication with a small recurrent hiatal hernia containing the gastroesophageal junction and gastric fundus.   Mild dilation of the common bile duct and intrahepatic biliary ducts has progressed

## 2019-04-03 NOTE — OPERATIVE REPORT
OPERATIVE REPORT   PATIENT NAME: Zainab Carty  MRN: [id number]  DATE OF OPERATION:  4/3/2019  PREOPERATIVE DIAGNOSIS:   1.  Abdominal pain, status post cholecystectomy, dilated bile duct and elevated liver enzymes  POSTOPERATIVE DIAGNOSES:  1. Bile duct towards the ampulla. No stones or sludge seen in the bile duct. The major papilla was visualized endoscopically and endosonographically and appeared unremarkable. No obvious peripancreatic lymphadenopathy seen.   The scope was then removed a suction the

## 2019-04-03 NOTE — ANESTHESIA PREPROCEDURE EVALUATION
Anesthesia PreOp Note    HPI:     Eusebia Chu is a 72year old female who presents for preoperative consultation requested by: Aixa Starr MD    Date of Surgery: 4/2/2019 - 4/3/2019    Procedure(s):  ENDOSCOPIC ULTRASOUND (EUS)  ENDOSCOPIC RETROGRA 10/14/2008      Diffuse cystic mastopathy         Date Noted: 06/23/2008        Past Medical History:   Diagnosis Date   • Appendicitis    • Asthma    • Cholelithiasis    • Esophageal reflux 2009   • Extrinsic asthma, unspecified    • Hiatal hernia    • Hi 04/03/19 0842   magnesium oxide (MAG-OX) tab 800 mg 800 mg Oral Daily Ramírez QUEZADA  mg at 04/03/19 9139   cholecalciferol (VITAMIN D3) cap/tab 2,000 Units 2,000 Units Oral Daily Holden Muhammad MD 2,000 Units at 04/03/19 6087   Pantoprazole Sodium Relationship status: Not on file      Intimate partner violence:        Fear of current or ex partner: Not on file        Emotionally abused: Not on file        Physically abused: Not on file        Forced sexual activity: Not on file    Other Topics (36.8 °C) 99 °F (37.2 °C) 98.6 °F (37 °C)    TempSrc: Oral Oral Oral    SpO2: 98% 93% 96% 98%   Weight:       Height:            Anesthesia ROS/Med Hx and Physical Exam      No history of anesthetic complications   Airway   Mallampati: II  TM distance: >3

## 2019-04-03 NOTE — PLAN OF CARE
Problem: Patient Centered Care  Goal: Patient preferences are identified and integrated in the patient's plan of care  Interventions:  - Provide timely, complete, and accurate information to patient/family  - Incorporate patient and family knowledge, value Consider OT/PT consult to assist with strengthening/mobility  - Encourage toileting schedule  Outcome: Progressing  Continue to assess safety, fall prevention measures in place, assist patient to bathroom - patient uses call light appropriately

## 2019-04-03 NOTE — ANESTHESIA PROCEDURE NOTES
ANESTHESIA INTUBATION  Date/Time: 4/3/2019 5:08 AM  Urgency: elective      General Information and Staff    Patient location during procedure: OR  Anesthesiologist: Jairo Dixon MD  Performed: anesthesiologist     Indications and Patient Condition  I

## 2019-04-04 ENCOUNTER — APPOINTMENT (OUTPATIENT)
Dept: GENERAL RADIOLOGY | Facility: HOSPITAL | Age: 74
End: 2019-04-04
Attending: INTERNAL MEDICINE
Payer: MEDICARE

## 2019-04-04 PROCEDURE — 73502 X-RAY EXAM HIP UNI 2-3 VIEWS: CPT | Performed by: INTERNAL MEDICINE

## 2019-04-04 PROCEDURE — 99226 SUBSEQUENT OBSERVATION CARE: CPT | Performed by: INTERNAL MEDICINE

## 2019-04-04 RX ORDER — POTASSIUM CHLORIDE 20 MEQ/1
40 TABLET, EXTENDED RELEASE ORAL EVERY 4 HOURS
Status: COMPLETED | OUTPATIENT
Start: 2019-04-04 | End: 2019-04-04

## 2019-04-04 RX ORDER — ACYCLOVIR 400 MG/1
800 TABLET ORAL
Status: DISCONTINUED | OUTPATIENT
Start: 2019-04-04 | End: 2019-04-05

## 2019-04-04 RX ORDER — POTASSIUM CHLORIDE 20 MEQ/1
40 TABLET, EXTENDED RELEASE ORAL ONCE
Status: COMPLETED | OUTPATIENT
Start: 2019-04-04 | End: 2019-04-04

## 2019-04-04 RX ORDER — TIZANIDINE 4 MG/1
4 TABLET ORAL EVERY 6 HOURS PRN
Status: DISCONTINUED | OUTPATIENT
Start: 2019-04-04 | End: 2019-04-05

## 2019-04-04 RX ORDER — GABAPENTIN 100 MG/1
200 CAPSULE ORAL 2 TIMES DAILY
Status: DISCONTINUED | OUTPATIENT
Start: 2019-04-04 | End: 2019-04-05

## 2019-04-04 NOTE — PHYSICAL THERAPY NOTE
PHYSICAL THERAPY EVALUATION - INPATIENT     Room Number: 522/522-A  Evaluation Date: 4/4/2019  Type of Evaluation: Initial   Physician Order: PT Eval and Treat    Presenting Problem: Abdominal pain; RLE/hip pain  Reason for Therapy: Mobility Dysfunction a in agreement. Pt does not have any further skilled IP PT needs at this time-please re-consult if pt's needs change.          DISCHARGE RECOMMENDATIONS  PT Discharge Recommendations: Home;Outpatient PT    PLAN    Patient has been evaluated and presents with left breast plate   • TUBAL LIGATION         HOME SITUATION  Type of Home: House   Home Layout: Two level  Stairs to Enter : 1  Railing: No  Stairs to Bedroom: 6  Railing: Yes(1 HR)    Lives With: Spouse;Daughter  Drives: Yes  Patient Owned Equipment: None Uncompensated trendelenburg  Stoop/Curb Assistance: Not tested(Pt declined; do not anticipate difficulties)       Bed Mobility: independent    Transfers: independent    Exercise/Education Provided:  Functional activity tolerated  Posture    Patient End of

## 2019-04-04 NOTE — DIETARY NOTE
ADULT NUTRITION INITIAL ASSESSMENT    Pt is at moderate nutrition risk. Pt does not meet malnutrition criteria.       RECOMMENDATIONS TO MD:  None at this time     NUTRITION DIAGNOSIS/PROBLEM: Inadequate oral intake related to altered GI function d/t dilat (5')       WT: 76.7 kg (169 lb)    BMI: Body mass index is 33.01 kg/m².          BMI Classification: 30-34.9 kg/m2 - obesity class I  IBW: 100#         169% IBW       Usual Body Wt: 180#--reported by pt ( c/w wt history)        94% UBW  WEIGHT HISTORY:   Wt

## 2019-04-04 NOTE — PLAN OF CARE
Problem: Patient Centered Care  Goal: Patient preferences are identified and integrated in the patient's plan of care  Interventions:  - Provide timely, complete, and accurate information to patient/family  - Incorporate patient and family knowledge, value based on physician/LIP order or complex needs related to functional status, cognitive ability or social support system  Outcome: Progressing      Problem: SAFETY ADULT - FALL  Goal: Free from fall injury  INTERVENTIONS:  - Assess pt frequently for physical

## 2019-04-04 NOTE — PROGRESS NOTES
[facility]  Gastroenterology Progress Note    Christian Brewster Patient Status:  Observation    1945 MRN [id number]   Location [facility]5W Attending Sandie Mcdaniels MD   Hosp Day # 0 PCP Terry Corea MD     Subjective:   Melinda Ballard dislocation. 2. Mild bilateral hip osteoarthritis. 3. Degeneration involving the symphysis.   Lower lumbar sacral spondylosis    Dictated by (CST): Marj Galeano MD on 4/04/2019 at 10:54     Approved by (CST): Marj Galeano MD on 4/04/2019 at 1

## 2019-04-05 VITALS
OXYGEN SATURATION: 97 % | HEART RATE: 77 BPM | TEMPERATURE: 98 F | WEIGHT: 169 LBS | RESPIRATION RATE: 16 BRPM | SYSTOLIC BLOOD PRESSURE: 137 MMHG | BODY MASS INDEX: 33.18 KG/M2 | DIASTOLIC BLOOD PRESSURE: 73 MMHG | HEIGHT: 60 IN

## 2019-04-05 PROBLEM — R00.0 TACHYCARDIA: Status: RESOLVED | Noted: 2019-04-02 | Resolved: 2019-04-05

## 2019-04-05 PROBLEM — R10.13 EPIGASTRIC PAIN: Status: RESOLVED | Noted: 2019-04-02 | Resolved: 2019-04-05

## 2019-04-05 PROBLEM — K83.9 BILE DUCT ABNORMALITY: Status: RESOLVED | Noted: 2019-04-02 | Resolved: 2019-04-05

## 2019-04-05 PROBLEM — R10.13 ABDOMINAL PAIN, EPIGASTRIC: Status: RESOLVED | Noted: 2019-04-02 | Resolved: 2019-04-05

## 2019-04-05 PROBLEM — R11.2 NON-INTRACTABLE VOMITING WITH NAUSEA: Status: RESOLVED | Noted: 2019-04-02 | Resolved: 2019-04-05

## 2019-04-05 PROCEDURE — 99217 OBSERVATION CARE DISCHARGE: CPT | Performed by: INTERNAL MEDICINE

## 2019-04-05 RX ORDER — TIZANIDINE 4 MG/1
4 TABLET ORAL EVERY 8 HOURS PRN
Qty: 30 TABLET | Refills: 0 | Status: SHIPPED | OUTPATIENT
Start: 2019-04-05 | End: 2019-08-06

## 2019-04-05 RX ORDER — GABAPENTIN 100 MG/1
200 CAPSULE ORAL 2 TIMES DAILY
Qty: 60 CAPSULE | Refills: 0 | Status: SHIPPED | OUTPATIENT
Start: 2019-04-05 | End: 2019-08-06

## 2019-04-05 NOTE — DISCHARGE SUMMARY
DISCHARGE SUMMARY     Med Adames Patient Status:  Observation    1945 MRN [id number]   Location [facility]5W Attending Arya Sosa MD   Hosp Day # 0 LEOLA Giang MD     Date of Admission: 2019  Date of Discharge:  appearance:  alert, appears stated age and cooperative  Neck: no adenopathy, no carotid bruit, no JVD, supple, symmetrical, trachea midline and thyroid not enlarged, symmetric, no tenderness/mass/nodules  Back: symmetric, no curvature.  ROM normal. No CVA t [address], [phone], [phone]  [address] [facility] [address], [address] [facility]    Phone:  [phone]   · gabapentin 100 MG Caps  · tiZANidine HCl 4 MG Tabs         Discharge Plan:  Discharge Condition: Good    Current Discharge Medic

## 2019-04-05 NOTE — PLAN OF CARE
Problem: Patient Centered Care  Goal: Patient preferences are identified and integrated in the patient's plan of care  Interventions:  - Provide timely, complete, and accurate information to patient/family  - Incorporate patient and family knowledge, value functional status, cognitive ability or social support system  Outcome: Progressing      Problem: SAFETY ADULT - FALL  Goal: Free from fall injury  INTERVENTIONS:  - Assess pt frequently for physical needs  - Identify cognitive and physical deficits and be

## 2019-04-08 ENCOUNTER — PATIENT OUTREACH (OUTPATIENT)
Dept: CASE MANAGEMENT | Age: 74
End: 2019-04-08

## 2019-04-08 DIAGNOSIS — R79.89 ELEVATED LFTS: ICD-10-CM

## 2019-04-08 DIAGNOSIS — Z02.9 ENCOUNTERS FOR ADMINISTRATIVE PURPOSE: ICD-10-CM

## 2019-04-08 PROCEDURE — 1111F DSCHRG MED/CURRENT MED MERGE: CPT

## 2019-04-08 NOTE — PROGRESS NOTES
[facility] [phone] for post hospital follow up, [facility] contact information provided.  TCM book by date 4-

## 2019-04-08 NOTE — PROGRESS NOTES
Initial Post Discharge Follow Up   Discharge Date: 4/5/19  Contact Date: 4/8/2019    Consent Verification:  Assessment Completed With: Patient  HIPAA Verified?   Yes    Discharge Dx:   Abdominal pain    General:   • How have you been since your discharge daily.   Disp:  Rfl:      • When you were leaving the hospital were any medication changes discussed with you? yes  • If you were prescribed a new medication:   o Was the new medication’s purpose explained? yes  o Was the new medication’s side effects disc traveling and not being available before then. She did state that if she needed to she would call back to see PCP sooner but at this time she feels good.         [x]  Discharge Summary, Discharge medications reviewed/discussed/and reconciled against outpati

## 2019-04-25 ENCOUNTER — OFFICE VISIT (OUTPATIENT)
Dept: PHYSICAL THERAPY | Facility: HOSPITAL | Age: 74
End: 2019-04-25
Attending: INTERNAL MEDICINE
Payer: MEDICARE

## 2019-04-25 DIAGNOSIS — R29.6 FREQUENT FALLS: ICD-10-CM

## 2019-04-25 PROCEDURE — 97110 THERAPEUTIC EXERCISES: CPT

## 2019-04-25 PROCEDURE — 97161 PT EVAL LOW COMPLEX 20 MIN: CPT

## 2019-04-25 NOTE — PROGRESS NOTES
LOWER EXTREMITY EVALUATION:   Referring Physician: Dr. Vera Hernandez  Date of Onset: February, 2019 Date of Service: 4/25/2019   Diagnosis: frequent falls  Precautions: hx of 3 falls in single week February, 2019   Insurance: [facility] Observation: standing posture with left knee flexed knee posture, mild obesity  Gait: patient demonstrates decreased gait stability without assistive devices utilized  Palpation: some tenderness at left quad and lateral ITB  Sensation: intact to BLE's    T Marked deviation =0; Mild/moderate deviation or use of aid =1; Straight without device=2 2      TRUNK  Marked sway or uses device =0; No sway but knee or trunk flexion or spread arms while walking =1; None of the above deviations=2 2      BASE OF SUPPORT · Pt will improve quad strength to 4+ to 5- strength and hip abductor strength to 4+ or better  to ascend 1 flight of stairs reciprocally without significant pain or limitation with use of single railing   · Patient with SLS each leg 5 secs or better witho

## 2019-04-30 ENCOUNTER — OFFICE VISIT (OUTPATIENT)
Dept: PHYSICAL THERAPY | Facility: HOSPITAL | Age: 74
End: 2019-04-30
Attending: INTERNAL MEDICINE
Payer: MEDICARE

## 2019-04-30 PROCEDURE — 97110 THERAPEUTIC EXERCISES: CPT

## 2019-04-30 NOTE — PROGRESS NOTES
Diagnosis: frequent falls  Precautions: hx of 3 falls in single week February 2019    Authorized # of Visits:  8  ( [address] [place],9D)      Next MD visit: none scheduled  Fall Risk: standard         Precautions: n/a           Medication Changes si falls      Plan: Continued PT for BLE's and core strengthening, standing balance activity to improved functional mobility.       Charges: Ex x 3       Total Timed Treatment: 45 min  Total Treatment Time: 47 min

## 2019-05-01 ENCOUNTER — TELEPHONE (OUTPATIENT)
Dept: PHYSICAL THERAPY | Facility: HOSPITAL | Age: 74
End: 2019-05-01

## 2019-05-13 ENCOUNTER — TELEPHONE (OUTPATIENT)
Dept: PHYSICAL THERAPY | Facility: HOSPITAL | Age: 74
End: 2019-05-13

## 2019-05-14 ENCOUNTER — OFFICE VISIT (OUTPATIENT)
Dept: INTERNAL MEDICINE CLINIC | Facility: CLINIC | Age: 74
End: 2019-05-14
Payer: COMMERCIAL

## 2019-05-14 ENCOUNTER — APPOINTMENT (OUTPATIENT)
Dept: PHYSICAL THERAPY | Facility: HOSPITAL | Age: 74
End: 2019-05-14
Attending: INTERNAL MEDICINE
Payer: MEDICARE

## 2019-05-14 VITALS
HEART RATE: 69 BPM | TEMPERATURE: 98 F | SYSTOLIC BLOOD PRESSURE: 128 MMHG | BODY MASS INDEX: 33 KG/M2 | OXYGEN SATURATION: 97 % | WEIGHT: 169 LBS | DIASTOLIC BLOOD PRESSURE: 83 MMHG

## 2019-05-14 DIAGNOSIS — J45.20 MILD INTERMITTENT REACTIVE AIRWAY DISEASE WITHOUT COMPLICATION: ICD-10-CM

## 2019-05-14 DIAGNOSIS — Z13.6 SCREENING FOR CARDIOVASCULAR CONDITION: ICD-10-CM

## 2019-05-14 DIAGNOSIS — M17.0 PRIMARY OSTEOARTHRITIS OF BOTH KNEES: ICD-10-CM

## 2019-05-14 DIAGNOSIS — J44.9 ASTHMA WITH COPD (CHRONIC OBSTRUCTIVE PULMONARY DISEASE) (HCC): ICD-10-CM

## 2019-05-14 DIAGNOSIS — Z00.00 ENCOUNTER FOR ANNUAL HEALTH EXAMINATION: ICD-10-CM

## 2019-05-14 DIAGNOSIS — M81.0 OSTEOPOROSIS, SENILE: ICD-10-CM

## 2019-05-14 DIAGNOSIS — K21.9 GASTROESOPHAGEAL REFLUX DISEASE, ESOPHAGITIS PRESENCE NOT SPECIFIED: ICD-10-CM

## 2019-05-14 DIAGNOSIS — Z78.0 POSTMENOPAUSAL: ICD-10-CM

## 2019-05-14 DIAGNOSIS — Z86.19 HISTORY OF SHINGLES: ICD-10-CM

## 2019-05-14 DIAGNOSIS — I77.1 TORTUOUS AORTA (HCC): ICD-10-CM

## 2019-05-14 DIAGNOSIS — I10 HYPERTENSION, BENIGN: ICD-10-CM

## 2019-05-14 DIAGNOSIS — Z12.31 VISIT FOR SCREENING MAMMOGRAM: ICD-10-CM

## 2019-05-14 DIAGNOSIS — M54.31 SCIATICA OF RIGHT SIDE: ICD-10-CM

## 2019-05-14 DIAGNOSIS — Z00.00 MEDICARE ANNUAL WELLNESS VISIT, SUBSEQUENT: Primary | ICD-10-CM

## 2019-05-14 DIAGNOSIS — F41.1 GENERALIZED ANXIETY DISORDER: ICD-10-CM

## 2019-05-14 DIAGNOSIS — N64.52 BREAST DISCHARGE: ICD-10-CM

## 2019-05-14 DIAGNOSIS — L98.9 SKIN LESION: ICD-10-CM

## 2019-05-14 DIAGNOSIS — H25.13 AGE-RELATED NUCLEAR CATARACT OF BOTH EYES: ICD-10-CM

## 2019-05-14 PROBLEM — J44.89 ASTHMA WITH COPD (CHRONIC OBSTRUCTIVE PULMONARY DISEASE) (HCC): Chronic | Status: ACTIVE | Noted: 2019-05-14

## 2019-05-14 PROBLEM — J44.89 ASTHMA WITH COPD (CHRONIC OBSTRUCTIVE PULMONARY DISEASE): Chronic | Status: ACTIVE | Noted: 2019-05-14

## 2019-05-14 PROCEDURE — 96160 PT-FOCUSED HLTH RISK ASSMT: CPT | Performed by: INTERNAL MEDICINE

## 2019-05-14 PROCEDURE — 90471 IMMUNIZATION ADMIN: CPT | Performed by: INTERNAL MEDICINE

## 2019-05-14 PROCEDURE — 90715 TDAP VACCINE 7 YRS/> IM: CPT | Performed by: INTERNAL MEDICINE

## 2019-05-14 PROCEDURE — 99397 PER PM REEVAL EST PAT 65+ YR: CPT | Performed by: INTERNAL MEDICINE

## 2019-05-14 PROCEDURE — G0439 PPPS, SUBSEQ VISIT: HCPCS | Performed by: INTERNAL MEDICINE

## 2019-05-14 NOTE — PROGRESS NOTES
HPI:   Rimma Chavez is a 72year old female who presents for a Medicare Subsequent Annual Wellness visit (Pt already had Initial Annual Wellness).     Pt here for medicare annual exam , complaints of rt breast discharge as per pt this is chronic problem screening   Ashley Whittaker was screened for Alcohol abuse and had a score of 0 so is at low risk.     Patient Care Team: Patient Care Team:  Trinity Webster MD as PCP - General (Internal Medicine)  [facility] (Physical Therapy)  Shawn Connors PT as Ph MD DAMIEN:  Vitamin D3 2000 units Oral Cap Take 2,000 Units by mouth daily. magnesium 250 MG Oral Tab Take 800 mg by mouth daily.         MEDICAL INFORMATION:   She  has a past medical history of Appendicitis, Asthma, Cholelithiasis, Esophageal reflux (2009), strain to understand conversations:  No   I have to worry about missing the telephone ring or doorbell:  No I have trouble hearing conversations in a noisy background such as a crowded room or restaurant:  Sometimes   I get confused about where sounds come Future    Tortuous aorta ([facility] [address]- stable   -     OP REFERRAL TO GENERAL SURGERY  -     LÁZARO SCREENING BILAT (CPT=69394); Future  -     XR DEXA BONE DENSITOMETRY (CPT=67641); Future  -     TSH W REFLEX TO FREE T4; Future  -     LIPID PANEL;  Future  -     EKG 1 LIPID PANEL; Future  -     EKG 12-LEAD; Future    Postmenopausal- will order bone density scan   -     OP REFERRAL TO GENERAL SURGERY  -     LÁZARO SCREENING BILAT (CPT=77043); Future  -     XR DEXA BONE DENSITOMETRY (CPT=55538);  Future  -     TSH W REFLEX TO Disease Screening     LDL Annually LDL Cholesterol (mg/dL)   Date Value   07/05/2017 127 (H)   09/22/2011 137 (H)        EKG - w/ Initial Preventative Physical Exam only, or if medically necessary Electrocardiogram date       Colorectal Cancer Screening injectable drug abusers     Tetanus Toxoid  Only covered with a cut with metal- TD and TDaP Not covered by Medicare Part B No vaccine history found This may be covered with your prescription benefits, but Medicare does not cover unless Medically needed Rfl: 0   Alendronate Sodium 70 MG Oral Tab Take 70 mg by mouth once a week.  Disp:  Rfl:      Allergies:  Latex                   OTHER (SEE COMMENTS)  Penicillins             UNKNOWN  Wasps                      PHYSICAL EXAM:   Physical Exam   Constitution ACELLULAR PERTUSIS VACCINE (TDAP), >7 YEARS, IM USE  OP REFERRAL TO GENERAL SURGERY  DERM - INTERNAL  SURGERY - INTERNAL   NAVIGATOR  LÁZARO SCREENING BILAT (CPT=77067)  XR DEXA BONE DENSITOMETRY (CPT=77080)  EKG 12-LEAD       #[id number]

## 2019-05-14 NOTE — PATIENT INSTRUCTIONS
Belinda Carroll's SCREENING SCHEDULE   Tests on this list are recommended by your physician but may not be covered, or covered at this frequency, by your insurer. Please check with your insurance carrier before scheduling to verify coverage.    PREVENTATIV Colonoscopy Screen   Covered every 10 years- more often if abnormal Colonoscopy due on 05/29/2013 Update [facility] if applicable    Flex Sigmoidoscopy Screen  Covered every 5 years No results found for this or any previous visit.  No flowsheet data Please get once after your 65th birthday    Pneumococcal 23 (Pneumovax)  Covered Once after 72 Orders placed or performed in visit on 07/05/17   • PNEUMOCOCCAL IMM (PNEUMOVAX)    Please get once after your 65th birthday    Hepatitis B for Moderate/High Ris

## 2019-05-16 ENCOUNTER — APPOINTMENT (OUTPATIENT)
Dept: PHYSICAL THERAPY | Facility: HOSPITAL | Age: 74
End: 2019-05-16
Attending: INTERNAL MEDICINE
Payer: MEDICARE

## 2019-05-17 ENCOUNTER — TELEPHONE (OUTPATIENT)
Dept: PHYSICAL THERAPY | Facility: HOSPITAL | Age: 74
End: 2019-05-17

## 2019-05-20 ENCOUNTER — APPOINTMENT (OUTPATIENT)
Dept: PHYSICAL THERAPY | Facility: HOSPITAL | Age: 74
End: 2019-05-20
Attending: INTERNAL MEDICINE
Payer: MEDICARE

## 2019-05-22 ENCOUNTER — APPOINTMENT (OUTPATIENT)
Dept: PHYSICAL THERAPY | Facility: HOSPITAL | Age: 74
End: 2019-05-22
Attending: INTERNAL MEDICINE
Payer: MEDICARE

## 2019-05-23 ENCOUNTER — LAB ENCOUNTER (OUTPATIENT)
Dept: LAB | Age: 74
End: 2019-05-23
Attending: INTERNAL MEDICINE
Payer: MEDICARE

## 2019-05-23 ENCOUNTER — APPOINTMENT (OUTPATIENT)
Dept: LAB | Age: 74
End: 2019-05-23
Attending: INTERNAL MEDICINE
Payer: MEDICARE

## 2019-05-23 ENCOUNTER — HOSPITAL ENCOUNTER (OUTPATIENT)
Dept: MAMMOGRAPHY | Age: 74
Discharge: HOME | End: 2019-05-23
Attending: INTERNAL MEDICINE
Payer: MEDICARE

## 2019-05-23 DIAGNOSIS — I77.1 TORTUOUS AORTA (HCC): ICD-10-CM

## 2019-05-23 DIAGNOSIS — Z13.6 SCREENING FOR CARDIOVASCULAR CONDITION: ICD-10-CM

## 2019-05-23 DIAGNOSIS — Z78.0 POSTMENOPAUSAL: ICD-10-CM

## 2019-05-23 DIAGNOSIS — J44.9 ASTHMA WITH COPD (CHRONIC OBSTRUCTIVE PULMONARY DISEASE) (HCC): ICD-10-CM

## 2019-05-23 DIAGNOSIS — Z00.00 ENCOUNTER FOR ANNUAL HEALTH EXAMINATION: ICD-10-CM

## 2019-05-23 DIAGNOSIS — I10 HYPERTENSION, BENIGN: ICD-10-CM

## 2019-05-23 DIAGNOSIS — H25.13 AGE-RELATED NUCLEAR CATARACT OF BOTH EYES: ICD-10-CM

## 2019-05-23 DIAGNOSIS — Z86.19 HISTORY OF SHINGLES: ICD-10-CM

## 2019-05-23 DIAGNOSIS — Z12.31 VISIT FOR SCREENING MAMMOGRAM: ICD-10-CM

## 2019-05-23 DIAGNOSIS — M17.0 PRIMARY OSTEOARTHRITIS OF BOTH KNEES: ICD-10-CM

## 2019-05-23 DIAGNOSIS — Z00.00 MEDICARE ANNUAL WELLNESS VISIT, SUBSEQUENT: ICD-10-CM

## 2019-05-23 PROCEDURE — 84439 ASSAY OF FREE THYROXINE: CPT

## 2019-05-23 PROCEDURE — 77067 SCR MAMMO BI INCL CAD: CPT | Performed by: INTERNAL MEDICINE

## 2019-05-23 PROCEDURE — 36415 COLL VENOUS BLD VENIPUNCTURE: CPT

## 2019-05-23 PROCEDURE — 84481 FREE ASSAY (FT-3): CPT

## 2019-05-23 PROCEDURE — 84443 ASSAY THYROID STIM HORMONE: CPT

## 2019-05-23 PROCEDURE — 80061 LIPID PANEL: CPT

## 2019-05-23 PROCEDURE — 77063 BREAST TOMOSYNTHESIS BI: CPT | Performed by: INTERNAL MEDICINE

## 2019-05-23 PROCEDURE — 93010 ELECTROCARDIOGRAM REPORT: CPT | Performed by: INTERNAL MEDICINE

## 2019-05-23 PROCEDURE — 93005 ELECTROCARDIOGRAM TRACING: CPT

## 2019-06-14 ENCOUNTER — HOSPITAL ENCOUNTER (OUTPATIENT)
Dept: MAMMOGRAPHY | Facility: HOSPITAL | Age: 74
Discharge: HOME | End: 2019-06-14
Attending: INTERNAL MEDICINE
Payer: MEDICARE

## 2019-06-14 ENCOUNTER — HOSPITAL ENCOUNTER (OUTPATIENT)
Dept: ULTRASOUND IMAGING | Facility: HOSPITAL | Age: 74
Discharge: HOME | End: 2019-06-14
Attending: INTERNAL MEDICINE
Payer: MEDICARE

## 2019-06-14 DIAGNOSIS — R21 RASH: ICD-10-CM

## 2019-06-14 DIAGNOSIS — R92.8 ABNORMAL MAMMOGRAM: ICD-10-CM

## 2019-06-14 DIAGNOSIS — T63.461A WASP STING, ACCIDENTAL OR UNINTENTIONAL, INITIAL ENCOUNTER: ICD-10-CM

## 2019-06-14 PROCEDURE — 76642 ULTRASOUND BREAST LIMITED: CPT | Performed by: INTERNAL MEDICINE

## 2019-06-14 PROCEDURE — 77061 BREAST TOMOSYNTHESIS UNI: CPT | Performed by: INTERNAL MEDICINE

## 2019-06-14 PROCEDURE — 77065 DX MAMMO INCL CAD UNI: CPT | Performed by: INTERNAL MEDICINE

## 2019-06-20 ENCOUNTER — OFFICE VISIT (OUTPATIENT)
Dept: DERMATOLOGY CLINIC | Facility: CLINIC | Age: 74
End: 2019-06-20
Payer: COMMERCIAL

## 2019-06-20 DIAGNOSIS — L82.0 INFLAMED SEBORRHEIC KERATOSIS: Primary | ICD-10-CM

## 2019-06-20 DIAGNOSIS — L82.1 SEBORRHEIC KERATOSES: ICD-10-CM

## 2019-06-20 PROCEDURE — G0463 HOSPITAL OUTPT CLINIC VISIT: HCPCS | Performed by: DERMATOLOGY

## 2019-06-20 PROCEDURE — 17110 DESTRUCTION B9 LES UP TO 14: CPT | Performed by: DERMATOLOGY

## 2019-06-20 PROCEDURE — 99201 OFFICE/OUTPT VISIT,NEW,LEVL I: CPT | Performed by: DERMATOLOGY

## 2019-06-20 NOTE — PROGRESS NOTES
Past Medical History:   Diagnosis Date   • Appendicitis    • Asthma    • Cholelithiasis    • Esophageal reflux 2009   • Extrinsic asthma, unspecified    • Hiatal hernia    • High blood pressure    • Osteoarthrosis, unspecified whether generalized or locali on file        Minutes per session: Not on file      Stress: Not on file    Relationships      Social connections:        Talks on phone: Not on file        Gets together: Not on file        Attends Zoroastrian service: Not on file        Active member of cl
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
## 2019-06-28 ENCOUNTER — LAB ENCOUNTER (OUTPATIENT)
Dept: LAB | Age: 74
End: 2019-06-28
Attending: INTERNAL MEDICINE
Payer: MEDICARE

## 2019-06-28 DIAGNOSIS — R79.89 ABNORMAL TSH: ICD-10-CM

## 2019-06-28 PROCEDURE — 84481 FREE ASSAY (FT-3): CPT

## 2019-06-28 PROCEDURE — 84443 ASSAY THYROID STIM HORMONE: CPT

## 2019-06-28 PROCEDURE — 36415 COLL VENOUS BLD VENIPUNCTURE: CPT

## 2019-06-28 PROCEDURE — 84439 ASSAY OF FREE THYROXINE: CPT

## 2019-07-15 ENCOUNTER — HOSPITAL ENCOUNTER (OUTPATIENT)
Dept: BONE DENSITY | Age: 74
Discharge: HOME | End: 2019-07-15
Attending: INTERNAL MEDICINE
Payer: MEDICARE

## 2019-07-15 DIAGNOSIS — Z00.00 MEDICARE ANNUAL WELLNESS VISIT, SUBSEQUENT: ICD-10-CM

## 2019-07-15 DIAGNOSIS — I10 HYPERTENSION, BENIGN: ICD-10-CM

## 2019-07-15 DIAGNOSIS — J44.9 ASTHMA WITH COPD (CHRONIC OBSTRUCTIVE PULMONARY DISEASE) (HCC): ICD-10-CM

## 2019-07-15 DIAGNOSIS — H25.13 AGE-RELATED NUCLEAR CATARACT OF BOTH EYES: ICD-10-CM

## 2019-07-15 DIAGNOSIS — I77.1 TORTUOUS AORTA (HCC): ICD-10-CM

## 2019-07-15 DIAGNOSIS — Z12.31 VISIT FOR SCREENING MAMMOGRAM: ICD-10-CM

## 2019-07-15 DIAGNOSIS — Z86.19 HISTORY OF SHINGLES: ICD-10-CM

## 2019-07-15 DIAGNOSIS — Z78.0 POSTMENOPAUSAL: ICD-10-CM

## 2019-07-15 DIAGNOSIS — Z13.6 SCREENING FOR CARDIOVASCULAR CONDITION: ICD-10-CM

## 2019-07-15 DIAGNOSIS — M17.0 PRIMARY OSTEOARTHRITIS OF BOTH KNEES: ICD-10-CM

## 2019-07-15 DIAGNOSIS — Z00.00 ENCOUNTER FOR ANNUAL HEALTH EXAMINATION: ICD-10-CM

## 2019-07-15 PROCEDURE — 77080 DXA BONE DENSITY AXIAL: CPT | Performed by: INTERNAL MEDICINE

## 2019-07-22 NOTE — PROGRESS NOTES
Breast Surgery New Patient Consultation    This is the first visit for this 72year old woman, referred by Dr Rush Bella, who presents for evaluation of right breast nipple inversion and retraction, also with 2 year history of spontaneous right breast y ARTHROSCOPY Right    • LAPAROSCOPIC INIT HERNIA REPAIR  2009    paraesophageal   • LÁZARO NEEDLE LOCALIZATION W/ SPECIMEN 1 SITE LEFT  2007   • LÁZARO NEEDLE LOCALIZATION W/ SPECIMEN 1 SITE RIGHT  2001   • OTHER      excision of left breast plate   • TUBAL LIGAT Systems:  General:   The patient denies, fever, chills, night sweats, fatigue, generalized weakness, change in appetite or weight loss.     HEENT:     The patient denies eye irritation, cataracts, redness, glaucoma, yellowing of the eyes, change in vision o severe headaches, seizure/epilepsy, speech problems, coordination problems, trembling/tremors, fainting/black outs, dizziness, memory problems, loss of sensation/numbness, problems walking, weakness, tingling or burning in hands/feet.  There is no history o are no dominant masses in the breast. The axillary tail is normal.    Abdomen: The abdomen is soft, flat and non tender. The liver is not enlarged. There are no palpable masses. Lymph Nodes:   The supraclavicular, axillary and cervical regions are free

## 2019-07-24 ENCOUNTER — OFFICE VISIT (OUTPATIENT)
Dept: SURGERY | Facility: CLINIC | Age: 74
End: 2019-07-24
Payer: COMMERCIAL

## 2019-07-24 VITALS
BODY MASS INDEX: 33.18 KG/M2 | OXYGEN SATURATION: 98 % | DIASTOLIC BLOOD PRESSURE: 99 MMHG | RESPIRATION RATE: 16 BRPM | HEART RATE: 75 BPM | SYSTOLIC BLOOD PRESSURE: 176 MMHG | HEIGHT: 60 IN | WEIGHT: 169 LBS

## 2019-07-24 DIAGNOSIS — N64.52 NIPPLE DISCHARGE: Primary | ICD-10-CM

## 2019-07-24 DIAGNOSIS — N64.59 INVERSION OF NIPPLE: ICD-10-CM

## 2019-07-24 PROCEDURE — 99205 OFFICE O/P NEW HI 60 MIN: CPT | Performed by: SURGERY

## 2019-07-24 NOTE — PATIENT INSTRUCTIONS
Surgeon: Dr Martinez Harris  [phone]  Fax [phone]  Procedure/Surgery: Right breast terminal duct excision, and correction of nipple inversion    [address] [phone]  1.  Someone must accompany

## 2019-07-29 ENCOUNTER — TELEPHONE (OUTPATIENT)
Dept: SURGERY | Facility: CLINIC | Age: 74
End: 2019-07-29

## 2019-07-29 DIAGNOSIS — N60.41 MAMMARY DUCT ECTASIA OF RIGHT BREAST: Primary | ICD-10-CM

## 2019-07-29 NOTE — TELEPHONE ENCOUNTER
Scheduling surgery on 8/29 at [facility] a sooner date, pt refused stating she has an event coming up.

## 2019-08-06 ENCOUNTER — OFFICE VISIT (OUTPATIENT)
Dept: ENDOCRINOLOGY CLINIC | Facility: CLINIC | Age: 74
End: 2019-08-06
Payer: COMMERCIAL

## 2019-08-06 ENCOUNTER — APPOINTMENT (OUTPATIENT)
Dept: LAB | Facility: HOSPITAL | Age: 74
End: 2019-08-06
Attending: INTERNAL MEDICINE
Payer: MEDICARE

## 2019-08-06 VITALS
WEIGHT: 167.63 LBS | HEART RATE: 68 BPM | BODY MASS INDEX: 32.91 KG/M2 | DIASTOLIC BLOOD PRESSURE: 100 MMHG | HEIGHT: 60 IN | SYSTOLIC BLOOD PRESSURE: 145 MMHG

## 2019-08-06 DIAGNOSIS — M85.80 OSTEOPENIA, UNSPECIFIED LOCATION: ICD-10-CM

## 2019-08-06 DIAGNOSIS — E05.90 SUBCLINICAL HYPERTHYROIDISM: Primary | ICD-10-CM

## 2019-08-06 DIAGNOSIS — E55.9 VITAMIN D DEFICIENCY: ICD-10-CM

## 2019-08-06 LAB
PTH-INTACT SERPL-MCNC: 68.8 PG/ML (ref 18.5–88)
TSI SER-ACNC: 0.47 MIU/ML (ref 0.36–3.74)

## 2019-08-06 PROCEDURE — 83970 ASSAY OF PARATHORMONE: CPT | Performed by: INTERNAL MEDICINE

## 2019-08-06 PROCEDURE — 84443 ASSAY THYROID STIM HORMONE: CPT | Performed by: INTERNAL MEDICINE

## 2019-08-06 PROCEDURE — 99203 OFFICE O/P NEW LOW 30 MIN: CPT | Performed by: INTERNAL MEDICINE

## 2019-08-06 PROCEDURE — 36415 COLL VENOUS BLD VENIPUNCTURE: CPT | Performed by: INTERNAL MEDICINE

## 2019-08-06 PROCEDURE — 82306 VITAMIN D 25 HYDROXY: CPT | Performed by: INTERNAL MEDICINE

## 2019-08-06 NOTE — H&P
New Patient Evaluation - History and Physical    CONSULT - Reason for Visit:  Subclinical Hyperthyroidism Requesting Physician: Dr. Saint Brewer:  Patient presents with:  Consult: Abnormal TSH result, referred by Dr. Bethany Valladares.        HI LIGATION         CURRENT MEDICATIONS:      Current Outpatient Medications:  Vitamin D3 2000 units Oral Cap Take 2,000 Units by mouth daily. Disp:  Rfl:    magnesium 250 MG Oral Tab Take 250 mg by mouth daily.    Disp:  Rfl:        ALLERGIES:    Latex lower extremity edema  Endocrine: Negative for: polyuria, polydypsia  Skin: Negative for: rash, blister, cellulitis,      PHYSICAL EXAM:    08/06/19  1100   BP: (!) 145/100   Pulse: 68   Weight: 167 lb 9.6 oz (76 kg)   Height: 5' (1.524 m)     BMI: Body ma above and did not have any further questions. No orders of the defined types were placed in this encounter.         8/6/2019  Usha Galaviz MD

## 2019-08-07 LAB — 25(OH)D3 SERPL-MCNC: 35.3 NG/ML (ref 30–100)

## 2019-08-16 ENCOUNTER — TELEPHONE (OUTPATIENT)
Dept: ENDOCRINOLOGY CLINIC | Facility: CLINIC | Age: 74
End: 2019-08-16

## 2019-08-16 DIAGNOSIS — E05.90 HYPERTHYROIDISM: Primary | ICD-10-CM

## 2019-08-19 NOTE — TELEPHONE ENCOUNTER
LMTCB    \"PTH, Vit D and TSH normal  Repeat TSH wit reflex to Free T4 in three months. \" per AM    No need to proceed w/ NM uptake/scan.

## 2019-08-19 NOTE — TELEPHONE ENCOUNTER
Patient verbalized understanding that labs are WNL and to repeat blood test in 3 mos. Pt does not have further questions at this time.  Lab ordered

## 2019-08-29 ENCOUNTER — ANESTHESIA (OUTPATIENT)
Dept: SURGERY | Facility: HOSPITAL | Age: 74
End: 2019-08-29
Payer: MEDICARE

## 2019-08-29 ENCOUNTER — HOSPITAL ENCOUNTER (OUTPATIENT)
Facility: HOSPITAL | Age: 74
Setting detail: HOSPITAL OUTPATIENT SURGERY
Discharge: HOME | End: 2019-08-29
Attending: SURGERY | Admitting: SURGERY
Payer: MEDICARE

## 2019-08-29 ENCOUNTER — ANESTHESIA EVENT (OUTPATIENT)
Dept: SURGERY | Facility: HOSPITAL | Age: 74
End: 2019-08-29
Payer: MEDICARE

## 2019-08-29 VITALS
HEART RATE: 67 BPM | SYSTOLIC BLOOD PRESSURE: 144 MMHG | WEIGHT: 169 LBS | OXYGEN SATURATION: 99 % | DIASTOLIC BLOOD PRESSURE: 62 MMHG | RESPIRATION RATE: 16 BRPM | BODY MASS INDEX: 33.18 KG/M2 | TEMPERATURE: 97 F | HEIGHT: 60 IN

## 2019-08-29 DIAGNOSIS — N60.41 MAMMARY DUCT ECTASIA OF RIGHT BREAST: ICD-10-CM

## 2019-08-29 PROCEDURE — 88307 TISSUE EXAM BY PATHOLOGIST: CPT | Performed by: SURGERY

## 2019-08-29 PROCEDURE — 0HBT0ZZ EXCISION OF RIGHT BREAST, OPEN APPROACH: ICD-10-PCS | Performed by: SURGERY

## 2019-08-29 PROCEDURE — 0HSWXZZ REPOSITION RIGHT NIPPLE, EXTERNAL APPROACH: ICD-10-PCS | Performed by: SURGERY

## 2019-08-29 RX ORDER — NALOXONE HYDROCHLORIDE 0.4 MG/ML
80 INJECTION, SOLUTION INTRAMUSCULAR; INTRAVENOUS; SUBCUTANEOUS AS NEEDED
Status: DISCONTINUED | OUTPATIENT
Start: 2019-08-29 | End: 2019-08-29

## 2019-08-29 RX ORDER — LIDOCAINE HYDROCHLORIDE AND EPINEPHRINE 10; 10 MG/ML; UG/ML
INJECTION, SOLUTION INFILTRATION; PERINEURAL AS NEEDED
Status: DISCONTINUED | OUTPATIENT
Start: 2019-08-29 | End: 2019-08-29 | Stop reason: HOSPADM

## 2019-08-29 RX ORDER — HYDROMORPHONE HYDROCHLORIDE 1 MG/ML
0.6 INJECTION, SOLUTION INTRAMUSCULAR; INTRAVENOUS; SUBCUTANEOUS EVERY 5 MIN PRN
Status: DISCONTINUED | OUTPATIENT
Start: 2019-08-29 | End: 2019-08-29

## 2019-08-29 RX ORDER — CLINDAMYCIN PHOSPHATE 150 MG/ML
INJECTION, SOLUTION INTRAVENOUS AS NEEDED
Status: DISCONTINUED | OUTPATIENT
Start: 2019-08-29 | End: 2019-08-29 | Stop reason: SURG

## 2019-08-29 RX ORDER — FAMOTIDINE 20 MG/1
20 TABLET ORAL ONCE
Status: COMPLETED | OUTPATIENT
Start: 2019-08-29 | End: 2019-08-29

## 2019-08-29 RX ORDER — LIDOCAINE HYDROCHLORIDE 10 MG/ML
INJECTION, SOLUTION EPIDURAL; INFILTRATION; INTRACAUDAL; PERINEURAL AS NEEDED
Status: DISCONTINUED | OUTPATIENT
Start: 2019-08-29 | End: 2019-08-29 | Stop reason: SURG

## 2019-08-29 RX ORDER — BUPIVACAINE HYDROCHLORIDE 5 MG/ML
INJECTION, SOLUTION EPIDURAL; INTRACAUDAL AS NEEDED
Status: DISCONTINUED | OUTPATIENT
Start: 2019-08-29 | End: 2019-08-29 | Stop reason: HOSPADM

## 2019-08-29 RX ORDER — HYDROMORPHONE HYDROCHLORIDE 1 MG/ML
0.4 INJECTION, SOLUTION INTRAMUSCULAR; INTRAVENOUS; SUBCUTANEOUS EVERY 5 MIN PRN
Status: DISCONTINUED | OUTPATIENT
Start: 2019-08-29 | End: 2019-08-29

## 2019-08-29 RX ORDER — MORPHINE SULFATE 10 MG/ML
6 INJECTION, SOLUTION INTRAMUSCULAR; INTRAVENOUS EVERY 10 MIN PRN
Status: DISCONTINUED | OUTPATIENT
Start: 2019-08-29 | End: 2019-08-29

## 2019-08-29 RX ORDER — HYDROCODONE BITARTRATE AND ACETAMINOPHEN 5; 325 MG/1; MG/1
2 TABLET ORAL AS NEEDED
Status: DISCONTINUED | OUTPATIENT
Start: 2019-08-29 | End: 2019-08-29

## 2019-08-29 RX ORDER — METOCLOPRAMIDE 10 MG/1
10 TABLET ORAL ONCE
Status: COMPLETED | OUTPATIENT
Start: 2019-08-29 | End: 2019-08-29

## 2019-08-29 RX ORDER — HYDROCODONE BITARTRATE AND ACETAMINOPHEN 5; 325 MG/1; MG/1
1-2 TABLET ORAL EVERY 6 HOURS PRN
Qty: 20 TABLET | Refills: 0 | Status: ON HOLD | OUTPATIENT
Start: 2019-08-29 | End: 2019-09-12

## 2019-08-29 RX ORDER — ACETAMINOPHEN 500 MG
1000 TABLET ORAL ONCE
Status: COMPLETED | OUTPATIENT
Start: 2019-08-29 | End: 2019-08-29

## 2019-08-29 RX ORDER — MORPHINE SULFATE 4 MG/ML
4 INJECTION, SOLUTION INTRAMUSCULAR; INTRAVENOUS EVERY 10 MIN PRN
Status: DISCONTINUED | OUTPATIENT
Start: 2019-08-29 | End: 2019-08-29

## 2019-08-29 RX ORDER — CLINDAMYCIN PHOSPHATE 900 MG/50ML
900 INJECTION INTRAVENOUS ONCE
Status: DISCONTINUED | OUTPATIENT
Start: 2019-08-29 | End: 2019-08-29 | Stop reason: HOSPADM

## 2019-08-29 RX ORDER — SODIUM CHLORIDE, SODIUM LACTATE, POTASSIUM CHLORIDE, CALCIUM CHLORIDE 600; 310; 30; 20 MG/100ML; MG/100ML; MG/100ML; MG/100ML
INJECTION, SOLUTION INTRAVENOUS CONTINUOUS
Status: DISCONTINUED | OUTPATIENT
Start: 2019-08-29 | End: 2019-08-29

## 2019-08-29 RX ORDER — ONDANSETRON 2 MG/ML
4 INJECTION INTRAMUSCULAR; INTRAVENOUS ONCE AS NEEDED
Status: DISCONTINUED | OUTPATIENT
Start: 2019-08-29 | End: 2019-08-29

## 2019-08-29 RX ORDER — PROCHLORPERAZINE EDISYLATE 5 MG/ML
5 INJECTION INTRAMUSCULAR; INTRAVENOUS ONCE AS NEEDED
Status: DISCONTINUED | OUTPATIENT
Start: 2019-08-29 | End: 2019-08-29

## 2019-08-29 RX ORDER — HYDROCODONE BITARTRATE AND ACETAMINOPHEN 5; 325 MG/1; MG/1
1 TABLET ORAL AS NEEDED
Status: DISCONTINUED | OUTPATIENT
Start: 2019-08-29 | End: 2019-08-29

## 2019-08-29 RX ORDER — MORPHINE SULFATE 2 MG/ML
2 INJECTION, SOLUTION INTRAMUSCULAR; INTRAVENOUS EVERY 10 MIN PRN
Status: DISCONTINUED | OUTPATIENT
Start: 2019-08-29 | End: 2019-08-29

## 2019-08-29 RX ORDER — HYDROMORPHONE HYDROCHLORIDE 1 MG/ML
0.2 INJECTION, SOLUTION INTRAMUSCULAR; INTRAVENOUS; SUBCUTANEOUS EVERY 5 MIN PRN
Status: DISCONTINUED | OUTPATIENT
Start: 2019-08-29 | End: 2019-08-29

## 2019-08-29 RX ADMIN — SODIUM CHLORIDE, SODIUM LACTATE, POTASSIUM CHLORIDE, CALCIUM CHLORIDE: 600; 310; 30; 20 INJECTION, SOLUTION INTRAVENOUS at 08:53:00

## 2019-08-29 RX ADMIN — CLINDAMYCIN PHOSPHATE 900 MG: 150 INJECTION, SOLUTION INTRAVENOUS at 08:56:00

## 2019-08-29 RX ADMIN — LIDOCAINE HYDROCHLORIDE 50 MG: 10 INJECTION, SOLUTION EPIDURAL; INFILTRATION; INTRACAUDAL; PERINEURAL at 08:59:00

## 2019-08-29 NOTE — OPERATIVE REPORT
[facility]    PATIENT'S NAME: Rebecca Barney   ATTENDING PHYSICIAN: Gopi Hale. King Maryam MD   OPERATING PHYSICIAN: Gopi Hale.  King Maryam MD   PATIENT ACCOUNT#:   [id number]    LOCATION:  [facility] 10  MEDICAL RECORD #:   [id number] incision was made in the lower outer areolar border with a 15 blade knife in the skin. Careful dissection was made around the inverted nipple with blunt probing with a mosquito.   Sharp dissection was used to dissect directly subareolar down to alleviate a

## 2019-08-29 NOTE — ANESTHESIA PREPROCEDURE EVALUATION
Anesthesia PreOp Note    HPI:     Shelby Jane is a 72year old female who presents for preoperative consultation requested by: Jen Tapia MD    Date of Surgery: 8/29/2019    Procedure(s):  BREAST BIOPSY NIPPLE DUCT EXPORATION  Indication: Mamm Date Noted: 05/25/2012      Osteoarthritis of knee         Date Noted: 05/25/2012      Vitamin D deficiency         Date Noted: 09/22/2011      Hypertension, benign         Date Noted: 10/14/2008      Irritable colon         Date Noted: 10/14/2008      Dif 0740   clindamycin (CLEOCIN) in D5W 900mg/50ml premix 900 mg Intravenous Once Deann Hanna MD      No current [facility]-ordered outpatient medications on file.       Penicillins             UNKNOWN    Comment:Unsure, from childhood  Wasps Emotionally abused: Not on file        Physically abused: Not on file        Forced sexual activity: Not on file    Other Topics      Concerns:        Grew up on a farm: Not Asked        History of tanning: Not Asked        Outdoor occupation: Not Asked MAC and general  Informed Consent Plan and Risks Discussed With:  Patient      I have informed Ezequieljin Crisostomo and/or legal guardian or family member of the nature of the anesthetic plan, benefits, risks including possible dental damage if relevant, major

## 2019-08-29 NOTE — ANESTHESIA POSTPROCEDURE EVALUATION
Patient: [facility]    Procedure Summary     Date:  08/29/19 Room / Location:  [address] [place] [address] MAIN OR 08 / [address] [place] [address] MAIN OR    Anesthesia Start:  7974 Anesthesia Stop:  7777    Procedure:  BREAST BIOPSY NIPPLE DUCT EXPORATION (Right Breast) Diagnosis:       Mammary

## 2019-08-29 NOTE — H&P
History of Present Illness:   Ms. Deandra Park is a 72year old woman who presents with a right self-detected nipple inversion and discharge. She was referred for diagnostic imaging which is detailed below.  She denies any palpable masses, skin changes plate   • TUBAL LIGATION             Gynecological History:  Pt is a   Pt was 26years old at time of first pregnancy. She denies any cumulative breastfeeding history.   She achieved menarche at age 17   Age of Menopause: 50  Type: Natural  She denie glaucoma, yellowing of the eyes, change in vision or color blindness. The patient denies hearing loss, ringing in the ears, ear drainage, earaches, nasal congestion, nose bleeds, snoring, pain in mouth/throat, hoarseness, change in voice, facial trauma.  We tingling or burning in hands/feet. There is no history of abusive relationship, bipolar disorder, sleep disturbance, anxiety, depression or feeling of despair.     Endocrine:     There is no history of poor/slow wound healing, weight loss/gain, fertility or Nodes:  The supraclavicular, axillary and cervical regions are free of significant lymphadenopathy.     Back: There is no vertebral column tenderness.     Skin: The skin appears normal. There are no suspicious appearing rashes or lesions.     Extremities: performed. I discussed with the patient and/or legal representative the potential benefits, risks and side effects of this procedure; the likelihood of the patient achieving goals; and potential problems that might occur during recuperation.   I discussed

## 2019-08-29 NOTE — BRIEF OP NOTE
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

## 2019-09-03 ENCOUNTER — TELEPHONE (OUTPATIENT)
Dept: SURGERY | Facility: CLINIC | Age: 74
End: 2019-09-03

## 2019-09-03 NOTE — TELEPHONE ENCOUNTER
Pt is a bit sore, but doing well. The gauze in in place, she is unable to remove, it is stuck to whats underneath. Will address tomorrow at her post op visit.    Reviewed with her that pathology showed benign duct ectasia, and warrants no further treatm

## 2019-09-04 ENCOUNTER — OFFICE VISIT (OUTPATIENT)
Dept: SURGERY | Facility: CLINIC | Age: 74
End: 2019-09-04
Payer: COMMERCIAL

## 2019-09-04 VITALS
BODY MASS INDEX: 33.18 KG/M2 | RESPIRATION RATE: 16 BRPM | DIASTOLIC BLOOD PRESSURE: 75 MMHG | OXYGEN SATURATION: 99 % | HEART RATE: 89 BPM | HEIGHT: 60 IN | SYSTOLIC BLOOD PRESSURE: 154 MMHG | WEIGHT: 169 LBS

## 2019-09-04 DIAGNOSIS — N60.41 DUCT ECTASIA OF BREAST, RIGHT: Primary | ICD-10-CM

## 2019-09-04 DIAGNOSIS — N64.59 INVERSION OF NIPPLE: ICD-10-CM

## 2019-09-04 PROCEDURE — 99024 POSTOP FOLLOW-UP VISIT: CPT | Performed by: SURGERY

## 2019-09-06 ENCOUNTER — TELEPHONE (OUTPATIENT)
Dept: SURGERY | Facility: CLINIC | Age: 74
End: 2019-09-06

## 2019-09-06 DIAGNOSIS — B99.9 INFECTION: ICD-10-CM

## 2019-09-06 DIAGNOSIS — N64.52 NIPPLE DISCHARGE: ICD-10-CM

## 2019-09-06 DIAGNOSIS — N64.59 INVERSION OF NIPPLE: Primary | ICD-10-CM

## 2019-09-06 RX ORDER — LEVOFLOXACIN 500 MG/1
500 TABLET, FILM COATED ORAL DAILY
Qty: 7 TABLET | Refills: 0 | Status: ON HOLD | OUTPATIENT
Start: 2019-09-06 | End: 2019-09-12

## 2019-09-06 NOTE — TELEPHONE ENCOUNTER
Pt phoned in to report she has noticed increased redness and tenderness on her breast. She showered yesterday, and today and applied the neosporin, and has a little bit of brown/red drainage. Today with redness and tenderness, denies fever.    Reviewed wi

## 2019-09-08 ENCOUNTER — APPOINTMENT (OUTPATIENT)
Dept: ULTRASOUND IMAGING | Facility: HOSPITAL | Age: 74
DRG: 857 | End: 2019-09-08
Attending: EMERGENCY MEDICINE
Payer: MEDICARE

## 2019-09-08 ENCOUNTER — HOSPITAL ENCOUNTER (INPATIENT)
Facility: HOSPITAL | Age: 74
LOS: 3 days | Discharge: HOME | DRG: 857 | End: 2019-09-12
Attending: EMERGENCY MEDICINE | Admitting: INTERNAL MEDICINE
Payer: MEDICARE

## 2019-09-08 DIAGNOSIS — T81.44XA SEPSIS FOLLOWING PROCEDURE, INITIAL ENCOUNTER (HCC): ICD-10-CM

## 2019-09-08 DIAGNOSIS — N61.1 ABSCESS OF RIGHT BREAST: Primary | ICD-10-CM

## 2019-09-08 PROCEDURE — 76642 ULTRASOUND BREAST LIMITED: CPT | Performed by: EMERGENCY MEDICINE

## 2019-09-08 RX ORDER — MORPHINE SULFATE 4 MG/ML
4 INJECTION, SOLUTION INTRAMUSCULAR; INTRAVENOUS ONCE
Status: COMPLETED | OUTPATIENT
Start: 2019-09-08 | End: 2019-09-08

## 2019-09-09 PROBLEM — N61.1 ABSCESS OF RIGHT BREAST: Status: ACTIVE | Noted: 2019-09-09

## 2019-09-09 PROBLEM — T81.44XA SEPSIS FOLLOWING PROCEDURE, INITIAL ENCOUNTER (HCC): Status: ACTIVE | Noted: 2019-09-09

## 2019-09-09 PROCEDURE — 99222 1ST HOSP IP/OBS MODERATE 55: CPT | Performed by: INTERNAL MEDICINE

## 2019-09-09 RX ORDER — ACETAMINOPHEN 325 MG/1
650 TABLET ORAL EVERY 6 HOURS PRN
Status: DISCONTINUED | OUTPATIENT
Start: 2019-09-09 | End: 2019-09-12

## 2019-09-09 RX ORDER — HYDROCODONE BITARTRATE AND ACETAMINOPHEN 5; 325 MG/1; MG/1
2 TABLET ORAL EVERY 6 HOURS PRN
Status: DISCONTINUED | OUTPATIENT
Start: 2019-09-09 | End: 2019-09-12

## 2019-09-09 RX ORDER — POTASSIUM CHLORIDE 20 MEQ/1
40 TABLET, EXTENDED RELEASE ORAL EVERY 4 HOURS
Status: DISCONTINUED | OUTPATIENT
Start: 2019-09-09 | End: 2019-09-09

## 2019-09-09 RX ORDER — MULTIVITAMIN/IRON/FOLIC ACID 18MG-0.4MG
250 TABLET ORAL DAILY
Status: DISCONTINUED | OUTPATIENT
Start: 2019-09-09 | End: 2019-09-12

## 2019-09-09 RX ORDER — SODIUM CHLORIDE 9 MG/ML
INJECTION, SOLUTION INTRAVENOUS CONTINUOUS
Status: DISCONTINUED | OUTPATIENT
Start: 2019-09-09 | End: 2019-09-12

## 2019-09-09 RX ORDER — HEPARIN SODIUM 5000 [USP'U]/ML
5000 INJECTION, SOLUTION INTRAVENOUS; SUBCUTANEOUS EVERY 12 HOURS SCHEDULED
Status: DISCONTINUED | OUTPATIENT
Start: 2019-09-09 | End: 2019-09-12

## 2019-09-09 RX ORDER — MORPHINE SULFATE 2 MG/ML
1 INJECTION, SOLUTION INTRAMUSCULAR; INTRAVENOUS
Status: DISCONTINUED | OUTPATIENT
Start: 2019-09-09 | End: 2019-09-12

## 2019-09-09 RX ORDER — MORPHINE SULFATE 2 MG/ML
2 INJECTION, SOLUTION INTRAMUSCULAR; INTRAVENOUS
Status: DISCONTINUED | OUTPATIENT
Start: 2019-09-09 | End: 2019-09-12

## 2019-09-09 RX ORDER — HYDROCODONE BITARTRATE AND ACETAMINOPHEN 5; 325 MG/1; MG/1
1 TABLET ORAL EVERY 6 HOURS PRN
Status: DISCONTINUED | OUTPATIENT
Start: 2019-09-09 | End: 2019-09-12

## 2019-09-09 NOTE — PROGRESS NOTES
Breast Surgery Visit    Diagnosis: Right breast ductal ectasia s/p terminal duct excision and correction of nipple inversion on August 29, 2019    Stage: N/A    Disease Status:  Surgical treatment complete, no further treatment pending. History:    This • Cholelithiasis    • Extrinsic asthma, unspecified    • Hiatal hernia    • Osteoarthrosis, unspecified whether generalized or localized, unspecified site    • Osteoporosis, senile 7/14/2017   • Postmenopausal bleeding    • Torn meniscus    • Unspecified COMMENTS)    Comment:Causes welts  Latex                   RASH, ITCHING    Comment:Latex tape causes rash    Family History:   Family History   Adopted: Yes   Problem Relation Age of Onset   • Cancer Brother         Lung   • Dementia Other        She is u urination, needing to get up at night to urinate, urinary hesitancy or retaining urine, painful urination, urinary incontinence, decreased urine stream, blood in the urine or vaginal/penile discharge.     Skin:    The patient denies rash, itching, skin lesi gallops or thrills. Breasts:  Her breasts are symmetrical with a cup size 38C. Right breast: The skin, appears normal. There is no skin dimpling with movement of the pectoralis.   There are no dominant masses in the breast. The axillary tail is normal. plan to do topical xeroform gauze and ABD pad for comfort while draining. We discussed that the internal contents were noted to have inspissated secretions and irritation which may delay wound healing. Surgery will continue to follow.      Sheila Willoughby

## 2019-09-09 NOTE — PLAN OF CARE
Problem: Patient Centered Care  Goal: Patient preferences are identified and integrated in the patient's plan of care  Description  Interventions:  - What would you like us to know as we care for you?  I had the biopsy 10 days ago  - Provide timely, compl management  - Manage/alleviate anxiety  - Utilize distraction and/or relaxation techniques  - Monitor for opioid side effects  - Notify MD/LIP if interventions unsuccessful or patient reports new pain  - Anticipate increased pain with activity and pre-medi

## 2019-09-09 NOTE — PROGRESS NOTES
[facility] Dosing Service    Initial Pharmacokinetic Consult for Vancomycin Dosing     Lia Vitale is a 72year old female who is being treated for RIGHT BREAST ABSCESS .   Pharmacy has been asked to dose Vancomycin by Dr. Edel Giang    She is allergic t

## 2019-09-09 NOTE — PLAN OF CARE
Discussed plan of care for day, including all given medications and their indications. Moderate amount brown drainage from right nipple/breast present with scabbing noted. Dressing changes continued as needed -- Xeroform gauze and ABD pad as needed.   IV pain and evaluate response  - Implement non-pharmacological measures as appropriate and evaluate response  - Consider cultural and social influences on pain and pain management  - Manage/alleviate anxiety  - Utilize distraction and/or relaxation techniques

## 2019-09-09 NOTE — ED PROVIDER NOTES
Patient Seen in: [facility] AND [facility] Emergency Department    History   Patient presents with:  Cellulitis (integumentary, infectious)    Stated Complaint: breast surgery 1 week, now infected since Friday    HPI    82-year-old female status post terminal du Constitutional: She is oriented to person, place, and time. She appears well-developed and well-nourished. HENT:   Head: Normocephalic and atraumatic. Neck: Normal range of motion. Neck supple.    Cardiovascular: Normal rate, regular rhythm and intact d The following orders were created for panel order CBC WITH DIFFERENTIAL WITH PLATELET.   Procedure                               Abnormality         Status                     ---------                               -----------         ------ Present on Admission  Date Reviewed: 8/29/2019          ICD-10-CM Noted POA    Abscess of right breast N61.1 9/9/2019 Unknown

## 2019-09-09 NOTE — H&P
[facility]    History and Physical    [facility]:  [facility]    1945 MRN [id number]   Location [facility] 4W/SW/SE Attending Bessy Liz MD   Hosp Day # 0 PCP Margeret Claude, MD     Date:  2019  D REPAIR  2009    paraesophageal   • LÁZARO NEEDLE LOCALIZATION 1 SITE LEFT (CPT=19281)  2007   • LÁZARO NEEDLE LOCALIZATION 1 SITE RIGHT (QNI=08331)  2001   • OTHER      excision of left breast plate   • OTHER Right 8/29/19    Right terminal duct excision with co Endocrine: Negative. Genitourinary: Positive for breast pain. Negative for difficulty urinating. Musculoskeletal: Negative. Allergic/Immunologic: Negative. Neurological: Negative. Psychiatric/Behavioral: Negative.         Physical Exam:   Vi 09/08/2019    TP 7.9 09/08/2019    AST 25 09/08/2019    ALT 28 09/08/2019    PTT 34.8 09/08/2019    INR 1.28 (H) 09/08/2019    T4F 1.0 06/28/2019    TSH 0.465 08/06/2019     04/02/2019    DDIMER 0.52 04/02/2019    ESRML 14 04/04/2019    CRP 2.82 (H)

## 2019-09-09 NOTE — PROGRESS NOTES
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     H 8/29/2019    Performed by Kristi Viera MD at [address] [place] [address] MAIN OR   • CHOLECYSTECTOMY     • COLONOSCOPY  05/2003    Sait   • D & C  2008   • D&C AFTER DELIVERY     • EGD  05/2003    Sait   • EGD  09/2008    hiatal hernia   • ENDOSCOPIC ULTRASOUND (EUS) N/A 4/ Relation Age of Onset   • Cancer Brother         Lung   • Dementia Other        She is unsure of Ashkenazi Jain ancestry.     Social History:    Alcohol use Yes 0.0 standard drinks/week   Comment: occasional wine         Smoking status: Former Smoker   Ty urine or vaginal/penile discharge. Skin:    The patient denies rash, itching, skin lesions, dry skin, change in skin color or change in moles.      Hematologic/Lymphatic:  The patient easily bruises without bleeding or persistent swollen glands or lymph movement of the pectoralis. There are no dominant masses in the breast. The axillary tail is normal.  There is an intact areolar incision with dry desquamation of the distal nipple tip.    Left breast:   The skin, nipple, and areola appear normal. There is

## 2019-09-09 NOTE — WOUND PROGRESS NOTE
Spoke with Linwood Fallon and she advised Dr. Carmel Baeza to come in today to eval and treat pt. Wound services will defer to surgeon. Please re consult if wound care is needed.

## 2019-09-09 NOTE — ED INITIAL ASSESSMENT (HPI)
Pt sts that she has right breast biopsy nipple duct exporation 10 days ago, sts that she was prescribed with levofloxacin to take QD since 3 days ago d/t infection to incision site, pt c/o foul smelling drainage to incision site started an hour ago and dec

## 2019-09-09 NOTE — WOUND PROGRESS NOTE
Wound services spoke to  Bedside RN Apolonia Stroud to advise a consult to Dr. Adriel Gibson regarding wound care since pt is s/p surgery of this site on 8/29/19. Wound services is available for wound care, dressing recs if ordered by surgeon. Bedside RN to update.

## 2019-09-10 PROCEDURE — 99232 SBSQ HOSP IP/OBS MODERATE 35: CPT | Performed by: INTERNAL MEDICINE

## 2019-09-10 RX ORDER — POTASSIUM CHLORIDE 1.5 G/1.77G
40 POWDER, FOR SOLUTION ORAL EVERY 4 HOURS
Status: COMPLETED | OUTPATIENT
Start: 2019-09-10 | End: 2019-09-10

## 2019-09-10 NOTE — PLAN OF CARE
No acute events overnight. Denies pain. Dressing to right breast remains clean/dry/intact. Vitals WNL. Plan for possible I&D today.        Problem: PAIN - ADULT  Goal: Verbalizes/displays adequate comfort level or patient's stated pain goal  Description  IN Assess and document risk factors for pressure ulcer development  - Assess and document skin integrity  - Assess and document dressing/incision, wound bed, drain sites and surrounding tissue  - Implement wound care per orders  - Initiate isolation precautio

## 2019-09-10 NOTE — PAYOR COMM NOTE
--------------  ADMISSION REVIEW     Payor: [facility] [place] #:  [id number]  Authorization Number: [id number]    ED Provider Notes        Patient Seen in: [facility] Emergency Department    History   Patient presents with:  Cell Physical Exam   Constitutional: She is oriented to person, place, and time. She appears well-developed and well-nourished. HENT:   Head: Normocephalic and atraumatic. Neck: Normal range of motion. Neck supple.    Cardiovascular: Normal rate, regular rhy The following orders were created for panel order CBC WITH DIFFERENTIAL WITH PLATELET.   Procedure                               Abnormality         Status                     ---------                               -----------         ------ Lia Vitale is a 72year old female with a history of HTN, with persistent R nipple discharge and inversion, with recent R breat terminal duct excision with correction of R breast nipple inversion 8/29, pathology benign, who presented to [facility] ED on 9/8 No murmur heard. Pulmonary/Chest: Effort normal and breath sounds normal. No respiratory distress. She has no wheezes. She has no rales. She exhibits no tenderness. no palpable masses  Abdominal: Soft. Bowel sounds are normal. She exhibits no distension. Assessment/Plan:        R breast cellulitis with abscess s/p recent R breast terminal duct excision with correction of R breast nipple inversion 8/29     follow up wound culture , continue with iv abx per id , surgery consult noted , no need for I&d ,local On exam awake, alert, in bed, no acute distress. EOMI. No oral lesions. Lungs are clear. Neck supple. Abdomen soft nontender. Right breast noted with central erythema with warmth and moderate tenderness.   At about 9:00 noted to have about a 1 cm deep # PCN allergy, states unknown reaction from childhood     PLAN:  -  Continue on vancomycin and cefepime pending cx results. -  Surgery following.  -  Follow fever curve, wbc.   -  Reviewed labs, micro, imaging reports.  -  Case d/w patient, CRISTOFER.  Luis Emery

## 2019-09-10 NOTE — PROGRESS NOTES
[facility] ID PROGRESS NOTE    Enedina Ford Patient Status:  Inpatient    1945 MRN [id number]   Location [facility] 4W/SW/SE Attending Corine Quintana MD   Hosp Day # 1 PCP Stephani Jasso MD     Subjective:  Awake, afebrile. No breast pain.  Less breast     Sepsis following procedure, initial encounter ([facility] [address]     Essential hypertension     Hypothyroidism     Hypokalemia      ASSESSMENT:    Antibiotics: Vancomycin, cefepime, day 1    72year old female with a history of HTN, with persistent R nipple

## 2019-09-10 NOTE — PROGRESS NOTES
[facility]    Progress Note    Socrates Bagley Patient Status:  Inpatient    1945 MRN [id number]   Location [facility] 4W/SW/SE Attending Raven Davison MD   Hosp Day # 1 PCP Clair Harden MD        Subjective:     Constit K 3.1 (L) 09/10/2019     09/09/2019    CO2 27.0 09/09/2019    GLU 92 09/09/2019    CA 7.7 (L) 09/09/2019    ALB 3.3 (L) 09/08/2019    ALKPHO 118 09/08/2019    BILT 1.2 09/08/2019    TP 7.9 09/08/2019    AST 25 09/08/2019    ALT 28 09/08/2019    PTT 3

## 2019-09-11 PROCEDURE — 99232 SBSQ HOSP IP/OBS MODERATE 35: CPT | Performed by: INTERNAL MEDICINE

## 2019-09-11 PROCEDURE — 0HBTXZZ EXCISION OF RIGHT BREAST, EXTERNAL APPROACH: ICD-10-PCS | Performed by: SURGERY

## 2019-09-11 RX ORDER — POTASSIUM CHLORIDE 1.5 G/1.77G
40 POWDER, FOR SOLUTION ORAL ONCE
Status: COMPLETED | OUTPATIENT
Start: 2019-09-11 | End: 2019-09-11

## 2019-09-11 NOTE — PROGRESS NOTES
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     History: History:   Diagnosis Date   • Appendicitis    • Asthma    • Cholelithiasis    • Extrinsic asthma, unspecified    • Hiatal hernia    • Osteoarthrosis, unspecified whether generalized or localized, unspecified site    • Osteoporosis, senile 7/14/2017   • Pos Comment:Unsure, from childhood  Was[place]                   OTHER (SEE COMMENTS)    Comment:Causes welts  Latex                   RASH, ITCHING    Comment:Latex tape causes rash    Family History:   Family History   Adopted: Yes   Problem Relation Age of Onset pain or vomiting blood.      Genitourinary:  The patient has frequent urination, needing to get up at night to urinate, urinary hesitancy or retaining urine, painful urination, urinary incontinence, decreased urine stream, blood in the urine or vaginal/peni auscultation. Heart: The rhythm is regular. There are no murmurs, rubs, gallops or thrills. Breasts:  Her breasts are symmetrical with a cup size 38C.   Right breast: The skin, appears normal. There is no skin dimpling with movement of the pectoralis required versus auto-amputation of the non-viable tissue. Cont IV antibiotics per ID recs until final cutures available with plan to switch to oral as appropriate. Will plan to do topical xeroform gauze and ABD pad for comfort while draining.  I am ok with

## 2019-09-11 NOTE — PROGRESS NOTES
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 and correction of nipple inversion on August 29, 2019    History:    This 72year old woman presented with a right self-detected nipple inversion and discharge unspecified    • Hiatal hernia    • Osteoarthrosis, unspecified whether generalized or localized, unspecified site    • Osteoporosis, senile 7/14/2017   • Postmenopausal bleeding    • Torn meniscus    • Unspecified essential hypertension    • Visual impair RASH, ITCHING    Comment:Latex tape causes rash    Family History:   Family History   Adopted: Yes   Problem Relation Age of Onset   • Cancer Brother         Lung   • Dementia Other        She is unsure of Ashkenazi Sabianist ancestry.     Soc urinate, urinary hesitancy or retaining urine, painful urination, urinary incontinence, decreased urine stream, blood in the urine or vaginal/penile discharge.     Skin:    The patient denies rash, itching, skin lesions, dry skin, change in skin color or ch breasts are symmetrical with a cup size 38C. Right breast: The skin, appears normal. There is no skin dimpling with movement of the pectoralis.   There are no dominant masses in the breast. The axillary tail is normal.  There is an area of wet desquamation was used to remove the necrotic tip of the right nipple and all fibrinous exudate underlying the incision site. Underlying the wet necrosis, she was noted to have clean, viable pink granulation tissue with adequate perfusion noted.   The wound was irrigate

## 2019-09-11 NOTE — PLAN OF CARE
Patient tolerating diet well, denies nausea. Debridement done at bedside by Dr. Sofia Shone, pt tolerated well. Dressings CDI. Pain managed with 1 tab norco. Ambulating with standby assist. Awaiting culture results prior to d/c.       Problem: Patient Centered C anxiety  - Utilize distraction and/or relaxation techniques  - Monitor for opioid side effects  - Notify MD/LIP if interventions unsuccessful or patient reports new pain  - Anticipate increased pain with activity and pre-medicate as appropriate  Outcome: P

## 2019-09-11 NOTE — PLAN OF CARE
Problem: Patient Centered Care  Goal: Patient preferences are identified and integrated in the patient's plan of care  Description  Interventions:  - What would you like us to know as we care for you? I like to be as independent as possible.   - Provide t appropriate  Outcome: Progressing     Problem: RISK FOR INFECTION - ADULT  Goal: Absence of fever/infection during anticipated neutropenic period  Description  INTERVENTIONS  - Monitor WBC  - Administer growth factors as ordered  - Implement neutropenic gu

## 2019-09-11 NOTE — PROGRESS NOTES
[facility] ID PROGRESS NOTE    Rhenda Grad Patient Status:  Inpatient    1945 MRN [id number]   Location [facility] 4W/SW/SE Attending Wilman Sethi MD   Hosp Day # 2 PCP Latrell Cardenas MD     Subjective:  Awake, afebrile.  S/p bedside I&D natasha Inflamed seborrheic keratosis     Discharge from right nipple     Subclinical hyperthyroidism     Abscess of right breast     Sepsis following procedure, initial encounter [facility])     Essential hypertension     Hypothyroidism     Hypokalemia      ASSESSMENT

## 2019-09-11 NOTE — PROGRESS NOTES
[facility]    Progress Note    Mahendraarlette Koroma Patient Status:  Inpatient    1945 MRN [id number]   Location [facility] 4W/SW/SE Attending Romana Moellers, MD   Hosp Day # 2 PCP Ernst Jimenez MD        Subjective:     Constit 09/11/2019    K 3.7 09/11/2019     09/11/2019    CO2 27.0 09/11/2019    GLU 89 09/11/2019    CA 8.1 (L) 09/11/2019    ALB 3.3 (L) 09/08/2019    ALKPHO 118 09/08/2019    BILT 1.2 09/08/2019    TP 7.9 09/08/2019    AST 25 09/08/2019    ALT 28 09/08/201

## 2019-09-12 VITALS
SYSTOLIC BLOOD PRESSURE: 159 MMHG | HEART RATE: 77 BPM | BODY MASS INDEX: 33.22 KG/M2 | RESPIRATION RATE: 18 BRPM | WEIGHT: 169.19 LBS | HEIGHT: 60 IN | DIASTOLIC BLOOD PRESSURE: 82 MMHG | OXYGEN SATURATION: 94 % | TEMPERATURE: 98 F

## 2019-09-12 PROCEDURE — 99232 SBSQ HOSP IP/OBS MODERATE 35: CPT | Performed by: INTERNAL MEDICINE

## 2019-09-12 RX ORDER — CEFADROXIL 500 MG/1
500 CAPSULE ORAL 2 TIMES DAILY
Qty: 60 CAPSULE | Refills: 0 | Status: SHIPPED | OUTPATIENT
Start: 2019-09-12 | End: 2019-10-12

## 2019-09-12 RX ORDER — POTASSIUM CHLORIDE 1.5 G/1.77G
40 POWDER, FOR SOLUTION ORAL ONCE
Status: COMPLETED | OUTPATIENT
Start: 2019-09-12 | End: 2019-09-12

## 2019-09-12 NOTE — PLAN OF CARE
Patient's pain controlled with 1 tab norco. Denies nausea. Dressing to R breast changed. Ambulating with standby assist. Plan to d/c home, Dr. Shailesh Terrazas office will call tomorrow for follow up next week. Education complete regarding dressing changes.  Suppli evaluate response  - Consider cultural and social influences on pain and pain management  - Manage/alleviate anxiety  - Utilize distraction and/or relaxation techniques  - Monitor for opioid side effects  - Notify MD/LIP if interventions unsuccessful or pa

## 2019-09-12 NOTE — PLAN OF CARE
A&Ox4. Up with standby. Right breast dressing changed x1 PRN as ordered. Cardiac diet. [place] for pain with relief. Scheduled Cefipime abx and IVF. PIV R arm leaking, new PIV started on Left wrist.  Pt denies any other complaints at this time.     Problem: P Manage/alleviate anxiety  - Utilize distraction and/or relaxation techniques  - Monitor for opioid side effects  - Notify MD/LIP if interventions unsuccessful or patient reports new pain  - Anticipate increased pain with activity and pre-medicate as approp

## 2019-09-12 NOTE — PROGRESS NOTES
[facility]    Progress Note    Mahendraarlette Koroma Patient Status:  Inpatient    1945 MRN [id number]   Location [facility] 4W/SW/SE Attending Romana Moellers, MD   Hosp Day # 3 PCP Ernst Jimenez MD        Subjective:     Constit 09/11/2019    .0 09/11/2019    CREATSERUM 0.45 (L) 09/11/2019    BUN 6 (L) 09/11/2019     09/11/2019    K 3.7 09/12/2019     09/11/2019    CO2 27.0 09/11/2019    GLU 89 09/11/2019    CA 8.1 (L) 09/11/2019    ALB 3.3 (L) 09/08/2019    ALK

## 2019-09-12 NOTE — PROGRESS NOTES
[facility] ID PROGRESS NOTE    Rita Neither Patient Status:  Inpatient    1945 MRN [id number]   Location [facility] 4W/SW/SE Attending Luis Felipe Ohara MD   Hosp Day # 3 PCP Emigdio Yates MD     Subjective:  Awake, afebrile.  Breast pain improved discharge     Skin lesion     Visit for screening mammogram     Postmenopausal     Inflamed seborrheic keratosis     Discharge from right nipple     Subclinical hyperthyroidism     Abscess of right breast     Sepsis following procedure, initial encounter (

## 2019-09-13 ENCOUNTER — TELEPHONE (OUTPATIENT)
Dept: SURGERY | Facility: CLINIC | Age: 74
End: 2019-09-13

## 2019-09-13 ENCOUNTER — PATIENT OUTREACH (OUTPATIENT)
Dept: CASE MANAGEMENT | Age: 74
End: 2019-09-13

## 2019-09-13 DIAGNOSIS — N61.1 ABSCESS OF RIGHT BREAST: ICD-10-CM

## 2019-09-13 DIAGNOSIS — Z02.9 ENCOUNTERS FOR ADMINISTRATIVE PURPOSE: ICD-10-CM

## 2019-09-13 PROCEDURE — 1111F DSCHRG MED/CURRENT MED MERGE: CPT

## 2019-09-13 NOTE — PAYOR COMM NOTE
--------------  DISCHARGE REVIEW    Payor: [facility] [place] #:  [id number]  Authorization Number: [id number]    Admit date: 9/9/19  Admit time:  1600  Discharge Date: 9/12/2019  8:50 PM     Admitting Physician: MD ABDULAZIZ Araujo

## 2019-09-13 NOTE — PROGRESS NOTES
Pt A&Ox4. VSS. PIV removed. Reviewed discharge instructions with patient. Pt given dressing instructions and dressing change supply by previous RN. Confirmed understanding with patient. Pt home with son. Pt escorted to car with staff.  Pt eager and to final

## 2019-09-13 NOTE — PROGRESS NOTES
Idaila Weber [phone] for post hospital follow up, [facility] contact information provided.

## 2019-09-13 NOTE — TELEPHONE ENCOUNTER
I contacted the patient with follow up from her hospitalization. She is doing OK with the dressing changes,\"it's only the first day. \"  She is feeling alright as well.    Given an appt for Wednesday, and asked her to call on Monday if any difficulty happ

## 2019-09-13 NOTE — PROGRESS NOTES
Initial Post Discharge Follow Up   Discharge Date: 9/12/19  Contact Date: 9/13/2019    Consent Verification:  Assessment Completed With: Patient  HIPAA Verified?   Yes    Discharge Dx:   Abscess of right breast     General:   • How have you been since you concerns with constipation? No    Referrals/orders at D/C:  Home Health ordered at D/C? No    DME ordered at D/C? No    Services ordered at D/C?   No        Needs post D/C:   Now that you are home, are there any needs or concerns you need addressed before y

## 2019-09-17 PROBLEM — N60.41 MAMMARY DUCT ECTASIA OF RIGHT BREAST: Status: ACTIVE | Noted: 2019-09-17

## 2019-09-18 ENCOUNTER — OFFICE VISIT (OUTPATIENT)
Dept: SURGERY | Facility: CLINIC | Age: 74
End: 2019-09-18
Payer: COMMERCIAL

## 2019-09-18 VITALS
SYSTOLIC BLOOD PRESSURE: 149 MMHG | HEIGHT: 60 IN | BODY MASS INDEX: 33.18 KG/M2 | OXYGEN SATURATION: 98 % | WEIGHT: 169 LBS | DIASTOLIC BLOOD PRESSURE: 67 MMHG | RESPIRATION RATE: 16 BRPM | HEART RATE: 76 BPM

## 2019-09-18 DIAGNOSIS — N60.41 DUCT ECTASIA OF BREAST, RIGHT: Primary | ICD-10-CM

## 2019-09-18 DIAGNOSIS — B99.9 INFECTION: ICD-10-CM

## 2019-09-18 PROCEDURE — 99024 POSTOP FOLLOW-UP VISIT: CPT | Performed by: SURGERY

## 2019-09-18 NOTE — DISCHARGE SUMMARY
DISCHARGE SUMMARY     Enedina Ford Patient Status:  Inpatient    1945 MRN [id number]   Location [facility] 4W/SW/SE Attending No att. providers found   Hosp Day # 3 PCP Stephani Jasso MD     Date of Admission: 2019  Date of Discharge: no masses,  no organomegaly    Procedures during hospitalization:   •     Incidental or significant findings and recommendations (brief descriptions):   •     Lab/Test results pending at Discharge:   ·     Consultants:  •     Discharge Medication List:

## 2019-09-18 NOTE — PROGRESS NOTES
Breast Surgery Post-Operative Visit    Diagnosis: Right breast ductal ectasia status post terminal duct excision and correction of nipple inversion on August 29, 2019    Stage: N/A    Disease Status:  Surgical treatment complete, currently undergoing manag essential hypertension    • Visual impairment     glasses       Past Surgical History:   Procedure Laterality Date   • APPENDECTOMY     • BREAST BIOPSY Right    • BREAST BIOPSY NIPPLE DUCT EXPORATION Right 8/29/2019    Performed by Junior Lozano MD at History   Adopted: Yes   Problem Relation Age of Onset   • Cancer Brother         Lung   • Dementia Other        She is unsure of Ashkenazi Shinto ancestry.     Social History:    Alcohol use Yes 0.0 standard drinks/week   Comment: occasional wine         S decreased urine stream, blood in the urine or vaginal discharge. Skin:    The patient denies rash, itching, skin lesions, dry skin, change in skin color or change in moles.      Hematologic/Lymphatic:  The patient easily bruises without bleeding or persi focal nodularity or skin changes on either side. The abdomen is soft, flat and nontender, with no palpable masses or organomegaly.     Impression:   Ms. Enedina Ford is a 69year old woman presented with right  nipple inversion and 2 years of spontaneou

## 2019-09-24 ENCOUNTER — OFFICE VISIT (OUTPATIENT)
Dept: INTERNAL MEDICINE CLINIC | Facility: CLINIC | Age: 74
End: 2019-09-24
Payer: COMMERCIAL

## 2019-09-24 VITALS
OXYGEN SATURATION: 98 % | RESPIRATION RATE: 16 BRPM | TEMPERATURE: 98 F | SYSTOLIC BLOOD PRESSURE: 150 MMHG | WEIGHT: 166 LBS | HEART RATE: 64 BPM | DIASTOLIC BLOOD PRESSURE: 84 MMHG | BODY MASS INDEX: 32.59 KG/M2 | HEIGHT: 60 IN

## 2019-09-24 DIAGNOSIS — Z09 HOSPITAL DISCHARGE FOLLOW-UP: Primary | ICD-10-CM

## 2019-09-24 DIAGNOSIS — I10 ESSENTIAL HYPERTENSION: ICD-10-CM

## 2019-09-24 DIAGNOSIS — Z28.21 IMMUNIZATION NOT CARRIED OUT BECAUSE OF PATIENT REFUSAL: ICD-10-CM

## 2019-09-24 PROCEDURE — 99495 TRANSJ CARE MGMT MOD F2F 14D: CPT | Performed by: INTERNAL MEDICINE

## 2019-09-24 RX ORDER — LISINOPRIL 5 MG/1
5 TABLET ORAL DAILY
Qty: 30 TABLET | Refills: 2 | Status: SHIPPED | OUTPATIENT
Start: 2019-09-24 | End: 2019-12-19

## 2019-09-24 NOTE — PROGRESS NOTES
HPI:    Celine Fowler is a 69year old female here today for hospital follow up.    [facility]   Admission Date: 9/8/19   Discharge Date: 9/12/19  Hospital Discharge Diagnoses (since 8/25/2019 Visit:  Cefadroxil 500 MG Oral Cap Take 1 capsule (500 mg total) by mouth 2 (two) times daily. No current facility-administered medications on file prior to visit.        HISTORY: reconciled and review with patient  She  has a past medical history of Ap 5' (1.524 m). Weight as of this encounter: 166 lb (75.3 kg).    /84 (BP Location: Left arm, Patient Position: Sitting, Cuff Size: adult)   Pulse 64   Temp 97.5 °F (36.4 °C) (Oral)   Resp 16   Ht 5' (1.524 m)   Wt 166 lb (75.3 kg)   SpO2 98%   Breas days:   · Number of Possible Diagnoses and/or Management Options: moderate  · Amount and/or Complexity of Data to Be Reviewed: moderate  · Risk of Significant Complications, Morbidity, and/or Mortality: moderate    Overall Risk:   moderate    Patient seen

## 2019-09-24 NOTE — PROGRESS NOTES
Breast Surgery Surveillance Visit    Diagnosis: Right breast ductal ectasia status post terminal duct excision and correction of nipple inversion on August 29, 2019    Stage: N/A    Disease Status:  Surgical treatment complete, no further treatment pending recommendations for further therapy.         Past Medical History:   Diagnosis Date   • Appendicitis    • Asthma    • Cholelithiasis    • Extrinsic asthma, unspecified    • Hiatal hernia    • Osteoarthrosis, unspecified whether generalized or localized, uns Disp: 30 tablet Rfl: 2   Cefadroxil 500 MG Oral Cap Take 1 capsule (500 mg total) by mouth 2 (two) times daily. Disp: 60 capsule Rfl: 0     No current facility-administered medications on file prior to visit.      Allergies:      Penicillins             UNK history of present illness    Gastrointestinal:     There is no history of difficulty or pain with swallowing, reflux symptoms, vomiting, dark or bloody stools, constipation, yellowing of the skin, indigestion, nausea, change in bowel habits, diarrhea, abd persistent 2 x 0.4 cm opening in the infra-areolar area with scattered areas of white fibrinous exudate. The depth of the wound is much improved to approximately 1 cm today. There is no blanching erythema or surrounding edema to the tissue.   The nipple i

## 2019-09-25 ENCOUNTER — OFFICE VISIT (OUTPATIENT)
Dept: SURGERY | Facility: CLINIC | Age: 74
End: 2019-09-25
Payer: COMMERCIAL

## 2019-09-25 VITALS
DIASTOLIC BLOOD PRESSURE: 62 MMHG | RESPIRATION RATE: 20 BRPM | SYSTOLIC BLOOD PRESSURE: 122 MMHG | HEART RATE: 74 BPM | TEMPERATURE: 98 F

## 2019-09-25 DIAGNOSIS — N60.41 DUCT ECTASIA OF BREAST, RIGHT: Primary | ICD-10-CM

## 2019-09-25 PROCEDURE — 99024 POSTOP FOLLOW-UP VISIT: CPT | Performed by: SURGERY

## 2019-10-08 NOTE — PROGRESS NOTES
Breast Surgery Surveillance Visit    Diagnosis: Right breast ductal ectasia status post terminal duct excision and correction of nipple inversion on August 29, 2019    Stage: N/A    Disease Status:  Surgical treatment complete, no further treatment pending unspecified whether generalized or localized, unspecified site    • Osteoporosis, senile 7/14/2017   • Postmenopausal bleeding    • Torn meniscus    • Unspecified essential hypertension    • Visual impairment     glasses       Past Surgical History:   Proc RASH, ITCHING    Comment:Latex tape causes rash    Family History:   Family History   Adopted: Yes   Problem Relation Age of Onset   • Cancer Brother         Lung   • Dementia Other        She is unsure of Ashkenazi Religion ancestry.     Social Hist urinary hesitancy or retaining urine, painful urination, urinary incontinence, decreased urine stream, blood in the urine or vaginal discharge. Skin:    The patient denies rash, itching, skin lesions, dry skin, change in skin color or change in moles. nontender, with no palpable masses or organomegaly.     Impression:   Ms. Kristian Cox is a 69year old woman presented with right  nipple inversion and 2 years of spontaneous yellow nipple discharge status post terminal duct excision with postoperative

## 2019-10-11 ENCOUNTER — OFFICE VISIT (OUTPATIENT)
Dept: SURGERY | Facility: CLINIC | Age: 74
End: 2019-10-11
Payer: COMMERCIAL

## 2019-10-11 VITALS — HEART RATE: 83 BPM | SYSTOLIC BLOOD PRESSURE: 144 MMHG | DIASTOLIC BLOOD PRESSURE: 83 MMHG

## 2019-10-11 DIAGNOSIS — B99.9 INFECTION: ICD-10-CM

## 2019-10-11 DIAGNOSIS — N60.41 DUCT ECTASIA OF BREAST, RIGHT: Primary | ICD-10-CM

## 2019-10-11 PROCEDURE — 99024 POSTOP FOLLOW-UP VISIT: CPT | Performed by: SURGERY

## 2019-10-23 ENCOUNTER — OFFICE VISIT (OUTPATIENT)
Dept: SURGERY | Facility: CLINIC | Age: 74
End: 2019-10-23
Payer: COMMERCIAL

## 2019-10-23 VITALS
RESPIRATION RATE: 16 BRPM | DIASTOLIC BLOOD PRESSURE: 78 MMHG | HEART RATE: 71 BPM | SYSTOLIC BLOOD PRESSURE: 153 MMHG | OXYGEN SATURATION: 100 %

## 2019-10-23 DIAGNOSIS — B99.9 INFECTION: ICD-10-CM

## 2019-10-23 DIAGNOSIS — N60.41 DUCT ECTASIA OF BREAST, RIGHT: Primary | ICD-10-CM

## 2019-10-23 PROCEDURE — 99024 POSTOP FOLLOW-UP VISIT: CPT | Performed by: SURGERY

## 2019-10-24 ENCOUNTER — MED REC SCAN ONLY (OUTPATIENT)
Dept: INTERNAL MEDICINE CLINIC | Facility: CLINIC | Age: 74
End: 2019-10-24

## 2019-10-24 ENCOUNTER — OFFICE VISIT (OUTPATIENT)
Dept: INTERNAL MEDICINE CLINIC | Facility: CLINIC | Age: 74
End: 2019-10-24
Payer: COMMERCIAL

## 2019-10-24 VITALS
OXYGEN SATURATION: 98 % | TEMPERATURE: 98 F | RESPIRATION RATE: 19 BRPM | HEART RATE: 91 BPM | HEIGHT: 60 IN | BODY MASS INDEX: 32.79 KG/M2 | SYSTOLIC BLOOD PRESSURE: 123 MMHG | WEIGHT: 167 LBS | DIASTOLIC BLOOD PRESSURE: 80 MMHG

## 2019-10-24 DIAGNOSIS — Z00.00 PREVENTATIVE HEALTH CARE: ICD-10-CM

## 2019-10-24 DIAGNOSIS — J34.89 STUFFY AND RUNNY NOSE: ICD-10-CM

## 2019-10-24 DIAGNOSIS — I10 ESSENTIAL HYPERTENSION: Primary | ICD-10-CM

## 2019-10-24 PROCEDURE — 90662 IIV NO PRSV INCREASED AG IM: CPT | Performed by: INTERNAL MEDICINE

## 2019-10-24 PROCEDURE — G0008 ADMIN INFLUENZA VIRUS VAC: HCPCS | Performed by: INTERNAL MEDICINE

## 2019-10-24 PROCEDURE — 99213 OFFICE O/P EST LOW 20 MIN: CPT | Performed by: INTERNAL MEDICINE

## 2019-10-24 NOTE — PROGRESS NOTES
HPI:    Patient ID: Richy Cloud is a 69year old female. HPI  Pt comes for follow up pn the BP, says in am is ok but afternoon sometimes elevated, pt take the bp med at night.  Pt also complaints of some runny nose for the ;ast few weeks and also so anti histamine meds and will let us know if not better     Orders Placed This Encounter      Occult Blood, Fecal, Immunoassay [E]      Flu High Dose >=65 yrs SINGLE dose Preserv Free [78446]      Meds This Visit:  Requested Prescriptions      No prescripti

## 2019-10-24 NOTE — PROGRESS NOTES
Breast Surgery Surveillance Visit    Diagnosis: Right breast ductal ectasia status post terminal duct excision and correction of nipple inversion on August 29, 2019    Stage: N/A    Disease Status:  Surgical treatment complete, no further treatment pending unspecified whether generalized or localized, unspecified site    • Osteoporosis, senile 7/14/2017   • Postmenopausal bleeding    • Torn meniscus    • Unspecified essential hypertension    • Visual impairment     glasses       Past Surgical History:   Proc RASH, ITCHING    Comment:Latex tape causes rash    Family History:   Family History   Adopted: Yes   Problem Relation Age of Onset   • Cancer Brother         Lung   • Dementia Other        She is unsure of Ashkenazi [employer] ancestry.     Social Hist urinary hesitancy or retaining urine, painful urination, urinary incontinence, decreased urine stream, blood in the urine or vaginal discharge. Skin:    The patient denies rash, itching, skin lesions, dry skin, change in skin color or change in moles. abdomen is soft, flat and nontender, with no palpable masses or organomegaly.     Impression:   Ms. Fred Alvarado is a 69year old woman presented with right  nipple inversion and 2 years of spontaneous yellow nipple discharge status post terminal duct e

## 2019-11-27 ENCOUNTER — OFFICE VISIT (OUTPATIENT)
Dept: SURGERY | Facility: CLINIC | Age: 74
End: 2019-11-27
Payer: COMMERCIAL

## 2019-11-27 VITALS
RESPIRATION RATE: 18 BRPM | DIASTOLIC BLOOD PRESSURE: 74 MMHG | OXYGEN SATURATION: 98 % | HEART RATE: 91 BPM | SYSTOLIC BLOOD PRESSURE: 151 MMHG

## 2019-11-27 DIAGNOSIS — N60.41 DUCT ECTASIA OF BREAST, RIGHT: Primary | ICD-10-CM

## 2019-11-27 DIAGNOSIS — Z12.39 SCREENING FOR BREAST CANCER: ICD-10-CM

## 2019-11-27 PROCEDURE — 99024 POSTOP FOLLOW-UP VISIT: CPT | Performed by: SURGERY

## 2019-12-03 NOTE — PROGRESS NOTES
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     Social History:    Alcohol use Yes 0.0 standard drinks/w urination, urinary incontinence, decreased urine stream, blood in the urine or vaginal discharge. Skin:    The patient denies rash, itching, skin lesions, dry skin, change in skin color or change in moles.      Hematologic/Lymphatic:  The patient easily inversion and 2 years of spontaneous yellow nipple discharge status post terminal duct excision with postoperative wound dehiscence and infection.     Discussion and Plan:  I had a discussion with the Patient regarding her breast exam. On exam today I found

## 2019-12-19 ENCOUNTER — TELEPHONE (OUTPATIENT)
Dept: INTERNAL MEDICINE CLINIC | Facility: CLINIC | Age: 74
End: 2019-12-19

## 2019-12-19 RX ORDER — LISINOPRIL 5 MG/1
5 TABLET ORAL DAILY
Qty: 90 TABLET | Refills: 1 | Status: SHIPPED | OUTPATIENT
Start: 2019-12-19 | End: 2020-06-28

## 2019-12-19 NOTE — TELEPHONE ENCOUNTER
Informed patient of Dr. Cheryle White advice per below. Patient stated she's out of medication and needs a refill. Will refill per protocol.   Hypertensive Medications  Protocol Criteria:  · Appointment scheduled in the past 6 months or in the next 3 eduard

## 2019-12-19 NOTE — TELEPHONE ENCOUNTER
Patient was given this new medication by you in [address] [place] [address] she is asking if she should continue or see you first . Please advice  Please send a refill if you want her to continue   •  lisinopril 5 MG Oral Tab, Take 1 tablet (5 mg total) by mouth daily. Tish Galaviz

## 2019-12-23 ENCOUNTER — TELEPHONE (OUTPATIENT)
Dept: ENDOCRINOLOGY CLINIC | Facility: CLINIC | Age: 74
End: 2019-12-23

## 2019-12-23 DIAGNOSIS — E05.90 HYPERTHYROIDISM: Primary | ICD-10-CM

## 2019-12-23 NOTE — TELEPHONE ENCOUNTER
Pt rec'd letter stating she is due for labs and she states she is unaware of this and order is not in system. RN discussed per [facility] dtd 8/16/19 - Dr Piper Garza wanted to recheck TSH w/ reflex in 3 mos.  Pt verbalized understanding and states she will go \"

## 2019-12-27 ENCOUNTER — LAB ENCOUNTER (OUTPATIENT)
Dept: LAB | Age: 74
End: 2019-12-27
Attending: INTERNAL MEDICINE
Payer: MEDICARE

## 2019-12-27 DIAGNOSIS — E05.90 HYPERTHYROIDISM: ICD-10-CM

## 2019-12-27 PROCEDURE — 84443 ASSAY THYROID STIM HORMONE: CPT

## 2019-12-27 PROCEDURE — 84481 FREE ASSAY (FT-3): CPT

## 2019-12-27 PROCEDURE — 36415 COLL VENOUS BLD VENIPUNCTURE: CPT

## 2019-12-27 PROCEDURE — 84439 ASSAY OF FREE THYROXINE: CPT

## 2019-12-29 ENCOUNTER — TELEPHONE (OUTPATIENT)
Dept: ENDOCRINOLOGY CLINIC | Facility: CLINIC | Age: 74
End: 2019-12-29

## 2019-12-30 NOTE — TELEPHONE ENCOUNTER
Patient has subclinical hyperthyroidism. TSH had improved, however, is low again now. Suggest that she do a NM thyroid scan and uptake. This will help assess thyroid function. Please book FU to review results and plan further management.     Thanks

## 2020-01-02 NOTE — TELEPHONE ENCOUNTER
Pt was contacted and relayed below recommendation from Dr. Dre Barraza. Phone number to outpatient scheduling and NM department provided to the patient. Pt requested for the order to be mailed. Address verified and mailed order.   Pt voiced understanding a

## 2020-01-17 ENCOUNTER — OFFICE VISIT (OUTPATIENT)
Dept: INTERNAL MEDICINE CLINIC | Facility: CLINIC | Age: 75
End: 2020-01-17
Payer: COMMERCIAL

## 2020-01-17 VITALS
OXYGEN SATURATION: 98 % | HEART RATE: 67 BPM | DIASTOLIC BLOOD PRESSURE: 82 MMHG | BODY MASS INDEX: 32.98 KG/M2 | TEMPERATURE: 98 F | SYSTOLIC BLOOD PRESSURE: 126 MMHG | WEIGHT: 168 LBS | HEIGHT: 60 IN | RESPIRATION RATE: 17 BRPM

## 2020-01-17 DIAGNOSIS — I10 ESSENTIAL HYPERTENSION: Primary | ICD-10-CM

## 2020-01-17 DIAGNOSIS — E05.90 SUBCLINICAL HYPERTHYROIDISM: ICD-10-CM

## 2020-01-17 PROCEDURE — 99213 OFFICE O/P EST LOW 20 MIN: CPT | Performed by: INTERNAL MEDICINE

## 2020-01-17 NOTE — PROGRESS NOTES
HPI:    Patient ID: Anni Vazquez is a 69year old female. HPI  Patient comes in today for follow-up overall she is doing okay seen Dr. Kia Duarte sick surgeon her breast wound has healed.   Also patient has seen endocrine for her subclinical hyperthyroidi watch salt intake  Subclinical hyperthyroidism follows with endocrine supposed to have nuclear thyroid scan. No orders of the defined types were placed in this encounter.       Meds This Visit:  Requested Prescriptions      No prescriptions requested or

## 2020-01-22 ENCOUNTER — HOSPITAL ENCOUNTER (OUTPATIENT)
Dept: NUCLEAR MEDICINE | Facility: HOSPITAL | Age: 75
Discharge: HOME | End: 2020-01-22
Attending: INTERNAL MEDICINE
Payer: MEDICARE

## 2020-01-22 DIAGNOSIS — E05.90 SUBCLINICAL HYPERTHYROIDISM: ICD-10-CM

## 2020-01-22 PROCEDURE — 78014 THYROID IMAGING W/BLOOD FLOW: CPT | Performed by: INTERNAL MEDICINE

## 2020-01-28 ENCOUNTER — OFFICE VISIT (OUTPATIENT)
Dept: ENDOCRINOLOGY CLINIC | Facility: CLINIC | Age: 75
End: 2020-01-28
Payer: COMMERCIAL

## 2020-01-28 VITALS
HEART RATE: 71 BPM | DIASTOLIC BLOOD PRESSURE: 91 MMHG | WEIGHT: 164 LBS | SYSTOLIC BLOOD PRESSURE: 144 MMHG | BODY MASS INDEX: 32 KG/M2

## 2020-01-28 DIAGNOSIS — E05.90 SUBCLINICAL HYPERTHYROIDISM: ICD-10-CM

## 2020-01-28 DIAGNOSIS — E04.1 THYROID NODULE: Primary | ICD-10-CM

## 2020-01-28 PROCEDURE — 99214 OFFICE O/P EST MOD 30 MIN: CPT | Performed by: INTERNAL MEDICINE

## 2020-01-28 NOTE — PROGRESS NOTES
Return Office Visit     CHIEF COMPLAINT:  Patient presents with:   Follow - Up: Subclinical hypothyroidism       HISTORY OF PRESENT ILLNESS:  Rita Reardon is a 69year old female who presents for follow up for for evaluation of subclinical hyperthyroidi frequency  Psychiatric: Negative for:  depression, anxiety  Hematology/Lymphatics: Negative for: bruising, lower extremity edema  Endocrine: Negative for: polyuria, polydypsia  Skin: Negative for: rash, blister, cellulitis,       PHYSICAL EXAM:    01/28/20 no CKD. Most likely secondary to low albumin. Also, recommend treatment of osteopenia given high fracture risk.  She will like to FU with her PCP for these issues.      Patient verbalized a complete  understanding of all of the above and did not have any fu

## 2020-02-17 ENCOUNTER — HOSPITAL ENCOUNTER (OUTPATIENT)
Dept: ULTRASOUND IMAGING | Facility: HOSPITAL | Age: 75
Discharge: HOME | End: 2020-02-17
Attending: INTERNAL MEDICINE
Payer: MEDICARE

## 2020-02-17 DIAGNOSIS — E04.1 THYROID NODULE: ICD-10-CM

## 2020-02-17 PROCEDURE — 76536 US EXAM OF HEAD AND NECK: CPT | Performed by: INTERNAL MEDICINE

## 2020-02-20 ENCOUNTER — WALK IN (OUTPATIENT)
Dept: URGENT CARE | Age: 75
End: 2020-02-20

## 2020-02-20 VITALS
TEMPERATURE: 98.8 F | HEIGHT: 61 IN | BODY MASS INDEX: 30.59 KG/M2 | HEART RATE: 112 BPM | OXYGEN SATURATION: 98 % | WEIGHT: 162.04 LBS | RESPIRATION RATE: 20 BRPM | DIASTOLIC BLOOD PRESSURE: 80 MMHG | SYSTOLIC BLOOD PRESSURE: 122 MMHG

## 2020-02-20 DIAGNOSIS — H66.92 LEFT OTITIS MEDIA, UNSPECIFIED OTITIS MEDIA TYPE: ICD-10-CM

## 2020-02-20 DIAGNOSIS — J06.9 ACUTE URI: Primary | ICD-10-CM

## 2020-02-20 PROCEDURE — 99214 OFFICE O/P EST MOD 30 MIN: CPT | Performed by: NURSE PRACTITIONER

## 2020-02-20 RX ORDER — FLUTICASONE PROPIONATE 50 MCG
SPRAY, SUSPENSION (ML) NASAL
Qty: 48 G | Refills: 0 | OUTPATIENT
Start: 2020-02-20

## 2020-02-20 RX ORDER — FLUTICASONE PROPIONATE 50 MCG
2 SPRAY, SUSPENSION (ML) NASAL DAILY
Qty: 16 G | Refills: 0 | Status: SHIPPED | OUTPATIENT
Start: 2020-02-20 | End: 2020-03-01

## 2020-02-20 RX ORDER — CLARITHROMYCIN 500 MG/1
500 TABLET, COATED ORAL EVERY 12 HOURS
Qty: 20 TABLET | Refills: 0 | Status: SHIPPED | OUTPATIENT
Start: 2020-02-20 | End: 2020-03-01

## 2020-02-20 RX ORDER — MULTIVIT-MIN/IRON/FOLIC ACID/K 18-600-40
CAPSULE ORAL
COMMUNITY

## 2020-02-20 RX ORDER — LISINOPRIL 5 MG/1
TABLET ORAL
Refills: 1 | COMMUNITY
Start: 2019-12-19

## 2020-02-20 RX ORDER — BENZONATATE 100 MG/1
100 CAPSULE ORAL 3 TIMES DAILY PRN
Qty: 30 CAPSULE | Refills: 0 | Status: SHIPPED | OUTPATIENT
Start: 2020-02-20

## 2020-02-20 ASSESSMENT — ENCOUNTER SYMPTOMS
SHORTNESS OF BREATH: 0
HEMATOLOGIC/LYMPHATIC NEGATIVE: 1
CHEST TIGHTNESS: 0
STRIDOR: 0
CHOKING: 0
RHINORRHEA: 1
ENDOCRINE NEGATIVE: 1
SINUS PRESSURE: 0
COUGH: 1
NEUROLOGICAL NEGATIVE: 1
SINUS PAIN: 0
GASTROINTESTINAL NEGATIVE: 1
WHEEZING: 0
APNEA: 0
PSYCHIATRIC NEGATIVE: 1
EYES NEGATIVE: 1
SORE THROAT: 0
CONSTITUTIONAL NEGATIVE: 1

## 2020-02-21 ENCOUNTER — TELEPHONE (OUTPATIENT)
Dept: ENDOCRINOLOGY CLINIC | Facility: CLINIC | Age: 75
End: 2020-02-21

## 2020-02-21 DIAGNOSIS — E04.1 THYROID NODULE: ICD-10-CM

## 2020-02-21 DIAGNOSIS — E05.90 SUBCLINICAL HYPERTHYROIDISM: Primary | ICD-10-CM

## 2020-02-21 NOTE — TELEPHONE ENCOUNTER
Patient has subclinical hyperthyroidism. She has a mix of hot and cold nodules on the NM thyroid scan. Hence, we had done a thyroid US   The thyroid US shows multiple big nodules.    Ideally given that size of many of these nodules being over 1 cm, they

## 2020-02-21 NOTE — TELEPHONE ENCOUNTER
Pt was contacted and discussed results and recommendations as outlined below. She is interested in doing thyroid biopsy. Patient informed that she would have to do labs first to make sure she's not hyperthyroid as biopsy cannot be done just yet.   Based o

## 2020-02-28 ENCOUNTER — OFFICE VISIT (OUTPATIENT)
Dept: INTERNAL MEDICINE CLINIC | Facility: CLINIC | Age: 75
End: 2020-02-28
Payer: COMMERCIAL

## 2020-02-28 ENCOUNTER — TELEPHONE (OUTPATIENT)
Dept: OTHER | Age: 75
End: 2020-02-28

## 2020-02-28 ENCOUNTER — NURSE TRIAGE (OUTPATIENT)
Dept: INTERNAL MEDICINE CLINIC | Facility: CLINIC | Age: 75
End: 2020-02-28

## 2020-02-28 VITALS
OXYGEN SATURATION: 97 % | HEIGHT: 60 IN | RESPIRATION RATE: 17 BRPM | BODY MASS INDEX: 30.23 KG/M2 | TEMPERATURE: 98 F | HEART RATE: 51 BPM | SYSTOLIC BLOOD PRESSURE: 106 MMHG | WEIGHT: 154 LBS | DIASTOLIC BLOOD PRESSURE: 78 MMHG

## 2020-02-28 DIAGNOSIS — R09.89 CHEST CONGESTION: ICD-10-CM

## 2020-02-28 DIAGNOSIS — R53.83 OTHER FATIGUE: ICD-10-CM

## 2020-02-28 DIAGNOSIS — R05.9 COUGH: Primary | ICD-10-CM

## 2020-02-28 PROCEDURE — 99214 OFFICE O/P EST MOD 30 MIN: CPT | Performed by: INTERNAL MEDICINE

## 2020-02-28 RX ORDER — PROMETHAZINE HYDROCHLORIDE 6.25 MG/5ML
6.25 SYRUP ORAL 3 TIMES DAILY PRN
Qty: 240 ML | Refills: 0 | Status: SHIPPED | OUTPATIENT
Start: 2020-02-28 | End: 2020-05-27

## 2020-02-28 RX ORDER — FLUTICASONE PROPIONATE 50 MCG
2 SPRAY, SUSPENSION (ML) NASAL
COMMUNITY
Start: 2020-02-20 | End: 2020-03-01

## 2020-02-28 RX ORDER — CLARITHROMYCIN 500 MG/1
500 TABLET, COATED ORAL
COMMUNITY
Start: 2020-02-20 | End: 2020-03-01

## 2020-02-28 RX ORDER — DOXYCYCLINE 100 MG/1
100 TABLET ORAL 2 TIMES DAILY
Qty: 20 TABLET | Refills: 0 | Status: SHIPPED | OUTPATIENT
Start: 2020-02-28 | End: 2020-03-09

## 2020-02-28 RX ORDER — MONTELUKAST SODIUM 10 MG/1
10 TABLET ORAL NIGHTLY
Qty: 30 TABLET | Refills: 0 | Status: SHIPPED | OUTPATIENT
Start: 2020-02-28 | End: 2020-05-27

## 2020-02-28 RX ORDER — BENZONATATE 100 MG/1
100 CAPSULE ORAL
COMMUNITY
Start: 2020-02-20 | End: 2020-05-27

## 2020-02-28 NOTE — TELEPHONE ENCOUNTER
SUMMARY: Action Requested: Summary for Provider     []  Critical Lab, Recommendations Needed  [x] Need Additional Advice  []   FYI    []   Need Orders  [] Need Medications Sent to Pharmacy  []  Other        Patient states she went on a [address]

## 2020-02-28 NOTE — PROGRESS NOTES
HPI:    Patient ID: Lobo Gonsalves is a 69year old female. HPI  Patient comes in today with complaint of ongoing cough which is not getting better is keeping her up she is not able to sleep at night.   Patient recently was on a cruise from [place] to [place] at[place]. Eyes: Pupils are equal, round, and reactive to light. Conjunctivae are normal. No scleral icterus. Neck: Normal range of motion. Neck supple. Cardiovascular: Normal rate and regular rhythm.    Pulmonary/Chest: Effort normal and breath soun

## 2020-02-28 NOTE — TELEPHONE ENCOUNTER
asked that we triage this patient for the exact locations of her travel to be sure we are following all CDC screening guidelines. Left message asking patient to call back and speak with an RN regarding her recent travel.     Please

## 2020-02-29 RX ORDER — MONTELUKAST SODIUM 10 MG/1
TABLET ORAL
Qty: 90 TABLET | Refills: 0 | OUTPATIENT
Start: 2020-02-29

## 2020-02-29 NOTE — TELEPHONE ENCOUNTER
Refill Protocol Appointment Criteria  · Appointment scheduled in the past 6 months or in the next 3 months  Recent Outpatient Visits            Yesterday Cough    Rosalinda Poon MD    Office Visit    1 month ago [facility] [place]

## 2020-03-09 ENCOUNTER — APPOINTMENT (OUTPATIENT)
Dept: LAB | Age: 75
End: 2020-03-09
Attending: INTERNAL MEDICINE
Payer: MEDICARE

## 2020-03-09 LAB
T3FREE SERPL-MCNC: 3.42 PG/ML (ref 2.4–4.2)
T4 FREE SERPL-MCNC: 1.3 NG/DL (ref 0.8–1.7)
TSI SER-ACNC: 0.06 MIU/ML (ref 0.36–3.74)

## 2020-03-09 PROCEDURE — 84443 ASSAY THYROID STIM HORMONE: CPT | Performed by: INTERNAL MEDICINE

## 2020-03-09 PROCEDURE — 84481 FREE ASSAY (FT-3): CPT | Performed by: INTERNAL MEDICINE

## 2020-03-09 PROCEDURE — 36415 COLL VENOUS BLD VENIPUNCTURE: CPT | Performed by: INTERNAL MEDICINE

## 2020-03-09 PROCEDURE — 84439 ASSAY OF FREE THYROXINE: CPT | Performed by: INTERNAL MEDICINE

## 2020-03-16 ENCOUNTER — TELEPHONE (OUTPATIENT)
Dept: ENDOCRINOLOGY CLINIC | Facility: CLINIC | Age: 75
End: 2020-03-16

## 2020-03-31 ENCOUNTER — TELEPHONE (OUTPATIENT)
Dept: ENDOCRINOLOGY CLINIC | Facility: CLINIC | Age: 75
End: 2020-03-31

## 2020-03-31 DIAGNOSIS — E05.90 HYPERTHYROIDISM: Primary | ICD-10-CM

## 2020-04-01 NOTE — TELEPHONE ENCOUNTER
Labs done 3/9/20.     Component      Latest Ref Rng & Units 3/9/2020 12/27/2019   TSH      0.358 - 3.740 mIU/mL 0.059 (L) 0.214 (L)   T4,Free (Direct)      0.8 - 1.7 ng/dL 1.3 1.0   T3 FREE      2.40 - 4.20 pg/mL 3.42 2.84

## 2020-04-02 NOTE — TELEPHONE ENCOUNTER
Labs indicate that TSH is lower than befpre  Low TSH indicates over activity of gland.   Her Free T4 and Free T3 are normal.   Plan was to do a biopsy of the thyroid nodules, however, non urgent procedures are being held at this time due to public health co

## 2020-04-03 NOTE — TELEPHONE ENCOUNTER
Spoke with patient and she verbalizes understanding of Dr. Jayant aMrcano note. Reviewed symptoms of hyperthyroidism, she denies any symptoms. She will call if she develops any symptoms. Lab orders placed for 2 months and message postponed until 6/1/20.

## 2020-05-26 ENCOUNTER — OFFICE VISIT (OUTPATIENT)
Dept: INTERNAL MEDICINE CLINIC | Facility: CLINIC | Age: 75
End: 2020-05-26
Payer: COMMERCIAL

## 2020-05-26 VITALS
TEMPERATURE: 99 F | WEIGHT: 166 LBS | RESPIRATION RATE: 18 BRPM | OXYGEN SATURATION: 99 % | HEIGHT: 60 IN | DIASTOLIC BLOOD PRESSURE: 76 MMHG | SYSTOLIC BLOOD PRESSURE: 116 MMHG | HEART RATE: 67 BPM | BODY MASS INDEX: 32.59 KG/M2

## 2020-05-26 DIAGNOSIS — F41.1 GENERALIZED ANXIETY DISORDER: ICD-10-CM

## 2020-05-26 DIAGNOSIS — H25.13 AGE-RELATED NUCLEAR CATARACT OF BOTH EYES: ICD-10-CM

## 2020-05-26 DIAGNOSIS — Z00.00 PREVENTATIVE HEALTH CARE: ICD-10-CM

## 2020-05-26 DIAGNOSIS — Z78.0 POSTMENOPAUSAL: ICD-10-CM

## 2020-05-26 DIAGNOSIS — M85.80 OSTEOPENIA, UNSPECIFIED LOCATION: ICD-10-CM

## 2020-05-26 DIAGNOSIS — M17.0 PRIMARY OSTEOARTHRITIS OF BOTH KNEES: ICD-10-CM

## 2020-05-26 DIAGNOSIS — I10 ESSENTIAL HYPERTENSION: ICD-10-CM

## 2020-05-26 DIAGNOSIS — Z86.19 HISTORY OF SHINGLES: ICD-10-CM

## 2020-05-26 DIAGNOSIS — E05.90 SUBCLINICAL HYPERTHYROIDISM: ICD-10-CM

## 2020-05-26 DIAGNOSIS — I77.1 TORTUOUS AORTA (HCC): ICD-10-CM

## 2020-05-26 DIAGNOSIS — E04.1 THYROID NODULE: ICD-10-CM

## 2020-05-26 DIAGNOSIS — J44.9 ASTHMA WITH COPD (CHRONIC OBSTRUCTIVE PULMONARY DISEASE) (HCC): ICD-10-CM

## 2020-05-26 DIAGNOSIS — E55.9 VITAMIN D DEFICIENCY: ICD-10-CM

## 2020-05-26 DIAGNOSIS — Z13.6 SCREENING FOR CARDIOVASCULAR CONDITION: ICD-10-CM

## 2020-05-26 DIAGNOSIS — Z00.00 ENCOUNTER FOR ANNUAL HEALTH EXAMINATION: ICD-10-CM

## 2020-05-26 DIAGNOSIS — Z00.00 MEDICARE ANNUAL WELLNESS VISIT, SUBSEQUENT: Primary | ICD-10-CM

## 2020-05-26 DIAGNOSIS — Z13.1 SCREENING FOR DIABETES MELLITUS (DM): ICD-10-CM

## 2020-05-26 PROBLEM — L82.0 INFLAMED SEBORRHEIC KERATOSIS: Status: RESOLVED | Noted: 2019-06-20 | Resolved: 2020-05-26

## 2020-05-26 PROBLEM — Z09 HOSPITAL DISCHARGE FOLLOW-UP: Status: RESOLVED | Noted: 2019-09-24 | Resolved: 2020-05-26

## 2020-05-26 PROBLEM — L98.9 SKIN LESION: Status: RESOLVED | Noted: 2019-05-14 | Resolved: 2020-05-26

## 2020-05-26 PROBLEM — Z12.31 VISIT FOR SCREENING MAMMOGRAM: Status: RESOLVED | Noted: 2019-05-14 | Resolved: 2020-05-26

## 2020-05-26 PROBLEM — N61.1 ABSCESS OF RIGHT BREAST: Status: RESOLVED | Noted: 2019-09-09 | Resolved: 2020-05-26

## 2020-05-26 PROBLEM — H52.4 MYOPIA OF BOTH EYES WITH ASTIGMATISM AND PRESBYOPIA: Status: RESOLVED | Noted: 2018-04-25 | Resolved: 2020-05-26

## 2020-05-26 PROBLEM — N64.52 DISCHARGE FROM RIGHT NIPPLE: Status: RESOLVED | Noted: 2019-07-24 | Resolved: 2020-05-26

## 2020-05-26 PROBLEM — N64.52 BREAST DISCHARGE: Status: RESOLVED | Noted: 2019-05-14 | Resolved: 2020-05-26

## 2020-05-26 PROBLEM — J45.909 REACTIVE AIRWAY DISEASE WITHOUT COMPLICATION: Status: RESOLVED | Noted: 2017-06-30 | Resolved: 2020-05-26

## 2020-05-26 PROBLEM — H52.203 MYOPIA OF BOTH EYES WITH ASTIGMATISM AND PRESBYOPIA: Status: RESOLVED | Noted: 2018-04-25 | Resolved: 2020-05-26

## 2020-05-26 PROBLEM — J45.909 REACTIVE AIRWAY DISEASE WITHOUT COMPLICATION (HCC): Status: RESOLVED | Noted: 2017-06-30 | Resolved: 2020-05-26

## 2020-05-26 PROBLEM — M81.0 OSTEOPOROSIS, SENILE: Status: RESOLVED | Noted: 2017-07-14 | Resolved: 2020-05-26

## 2020-05-26 PROBLEM — H52.13 MYOPIA OF BOTH EYES WITH ASTIGMATISM AND PRESBYOPIA: Status: RESOLVED | Noted: 2018-04-25 | Resolved: 2020-05-26

## 2020-05-26 PROBLEM — F17.201 TOBACCO DEPENDENCE IN REMISSION: Status: RESOLVED | Noted: 2017-06-30 | Resolved: 2020-05-26

## 2020-05-26 PROBLEM — H43.393 VITREOUS FLOATERS OF BOTH EYES: Status: RESOLVED | Noted: 2018-04-25 | Resolved: 2020-05-26

## 2020-05-26 PROBLEM — T81.44XA SEPSIS FOLLOWING PROCEDURE, INITIAL ENCOUNTER (HCC): Status: RESOLVED | Noted: 2019-09-09 | Resolved: 2020-05-26

## 2020-05-26 PROCEDURE — 99397 PER PM REEVAL EST PAT 65+ YR: CPT | Performed by: INTERNAL MEDICINE

## 2020-05-26 PROCEDURE — G0439 PPPS, SUBSEQ VISIT: HCPCS | Performed by: INTERNAL MEDICINE

## 2020-05-26 PROCEDURE — 93000 ELECTROCARDIOGRAM COMPLETE: CPT | Performed by: INTERNAL MEDICINE

## 2020-05-26 PROCEDURE — 96160 PT-FOCUSED HLTH RISK ASSMT: CPT | Performed by: INTERNAL MEDICINE

## 2020-05-26 NOTE — PATIENT INSTRUCTIONS
Jennifer Carroll's SCREENING SCHEDULE   Tests on this list are recommended by your physician but may not be covered, or covered at this frequency, by your insurer. Please check with your insurance carrier before scheduling to verify coverage.    PREVENTAT often if abnormal Colonoscopy due on 05/29/2013 Update [facility] if applicable    Flex Sigmoidoscopy Screen  Covered every 5 years No results found for this or any previous visit. No flowsheet data found.      Fecal Occult Blood   Covered Annually after your 65th birthday    Pneumococcal 23 (Pneumovax)  Covered Once after 72 Orders placed or performed in visit on 07/05/17   • PNEUMOCOCCAL IMM (PNEUMOVAX)    Please get once after your 65th birthday    Hepatitis B for Moderate/High Risk       No order

## 2020-05-26 NOTE — PROGRESS NOTES
HPI:   Yamile Yan is a 69year old female who presents for a MA (Medicare Advantage) [address] [facility] (Once per calendar year).     Patient comes in for Medicare annual physical overall she says she is been doing great denies any new complaints today she score of 0 so is at low risk.     Patient Care Team: Patient Care Team:  Kavya Dukes MD as PCP - General (Internal Medicine)  Deric Pizarro (Physical Therapy)  Alexander Ramírez PT as Physical Therapist (Physical Therapy)  Abril Mac PTA as Physical meniscus, Unspecified essential hypertension, and Visual impairment.     She  has a past surgical history that includes cholecystectomy; d&c after delivery; Breast biopsy (Right); other; tubal ligation; knee arthroscopy (Right); d & c (2008); laparoscopic i themselves:  No   I especially have trouble understanding the speech of women and children:  No I have trouble understanding the speaker in a large room such as at a meeting or place of Hindu:  No   Many people I talk to seem to mumble (or don't speak cl COMPLETE    Tortuous aorta (HCC)-stable no specific treatment  -     LIPID PANEL  -     COMP METABOLIC PANEL (14)  -     HEMOGLOBIN A1C  -     CBC WITH DIFFERENTIAL WITH PLATELET  -     ELECTROCARDIOGRAM, COMPLETE    Postmenopausal she had bone density las 2 - No  Has your appetite been poor?: No  How does the patient maintain a good energy level?: Appropriate Exercise;Daily Walks  How would you describe your daily physical activity?: Moderate  How would you describe your current health state?: Good  How do 06/14/2020 Update Health Maintenance if applicable     Immunizations (Update Immunization Activity if applicable)     Influenza  Covered Annually 10/24/2019 Please get every year    Pneumococcal 13 (Prevnar)  Covered Once after 65 06/24/2015 Please get onc Negative. Respiratory: Negative. Cardiovascular: Negative. Gastrointestinal: Negative. Genitourinary: Negative. Musculoskeletal: Negative. Skin: Negative. Neurological: Negative. Psychiatric/Behavioral: Negative.              Current condition  Encounter for annual health examination  Screening for diabetes mellitus (dm)  Essential hypertension  Primary osteoarthritis of both knees  History of shingles  Age-related nuclear cataract of both eyes  Osteopenia, unspecified location  [facility]

## 2020-05-27 ENCOUNTER — APPOINTMENT (OUTPATIENT)
Dept: LAB | Age: 75
End: 2020-05-27
Attending: INTERNAL MEDICINE
Payer: MEDICARE

## 2020-05-27 DIAGNOSIS — E05.90 HYPERTHYROIDISM: ICD-10-CM

## 2020-05-27 PROCEDURE — 85025 COMPLETE CBC W/AUTO DIFF WBC: CPT | Performed by: INTERNAL MEDICINE

## 2020-05-27 PROCEDURE — 36415 COLL VENOUS BLD VENIPUNCTURE: CPT | Performed by: INTERNAL MEDICINE

## 2020-05-27 PROCEDURE — 80061 LIPID PANEL: CPT | Performed by: INTERNAL MEDICINE

## 2020-05-27 PROCEDURE — 82306 VITAMIN D 25 HYDROXY: CPT | Performed by: INTERNAL MEDICINE

## 2020-05-27 PROCEDURE — 80053 COMPREHEN METABOLIC PANEL: CPT | Performed by: INTERNAL MEDICINE

## 2020-05-27 PROCEDURE — 84439 ASSAY OF FREE THYROXINE: CPT

## 2020-05-27 PROCEDURE — 83036 HEMOGLOBIN GLYCOSYLATED A1C: CPT | Performed by: INTERNAL MEDICINE

## 2020-05-27 PROCEDURE — 84481 FREE ASSAY (FT-3): CPT

## 2020-05-27 PROCEDURE — 84443 ASSAY THYROID STIM HORMONE: CPT

## 2020-06-02 ENCOUNTER — TELEPHONE (OUTPATIENT)
Dept: ENDOCRINOLOGY CLINIC | Facility: CLINIC | Age: 75
End: 2020-06-02

## 2020-06-02 DIAGNOSIS — Z13.9 SCREENING FOR CONDITION: ICD-10-CM

## 2020-06-02 DIAGNOSIS — E04.1 THYROID NODULE: Primary | ICD-10-CM

## 2020-06-02 NOTE — TELEPHONE ENCOUNTER
Patient's labs had indicated over activity of the thyroid gland  We had done a thyroid scan that showed a mix of hot ( over active) and cold nodules. Next we did a thyroid ultrasound that showed many nodules.    I reviewed her US and scan with radilogy an

## 2020-06-05 NOTE — TELEPHONE ENCOUNTER
I ordered the FNA  I am not sure if we do the insurace verification for this  I was of the understanding that the patient ahs to call her insuarance.  We can clarify with the radiology department on how this is done and let the patient know  Also, when I or

## 2020-06-05 NOTE — TELEPHONE ENCOUNTER
Left message to call back to discuss    Dr. Kita Chen, when she returns call and I have a discussion with her, if she would like to see you to discuss further may I offer a virtual visit?

## 2020-06-05 NOTE — TELEPHONE ENCOUNTER
Dr. Elly Dwyer,     Patient was contacted and relayed your recommendation as outlined below. RN offered an appointment if she would like to discuss this further. She declined an appointment stating both of you have gone through this over and over again.

## 2020-06-05 NOTE — TELEPHONE ENCOUNTER
In person is preferable since it is easier to explain face to face  If she declines, then we can do virtual video   Thanks

## 2020-06-10 NOTE — TELEPHONE ENCOUNTER
I checked the patient's referral documentation for the FNA recently ordered by Dr. Kristin Busby. Per chart, no authorization is required by her plan and the procedure was marked as authorized.  If the patient has concerns about this I will refer her to man

## 2020-06-17 ENCOUNTER — HOSPITAL ENCOUNTER (OUTPATIENT)
Dept: MAMMOGRAPHY | Age: 75
Discharge: HOME | End: 2020-06-17
Attending: SURGERY
Payer: MEDICARE

## 2020-06-17 ENCOUNTER — TELEPHONE (OUTPATIENT)
Dept: SURGERY | Facility: CLINIC | Age: 75
End: 2020-06-17

## 2020-06-17 DIAGNOSIS — Z12.39 SCREENING FOR BREAST CANCER: ICD-10-CM

## 2020-06-17 PROCEDURE — 77067 SCR MAMMO BI INCL CAD: CPT | Performed by: SURGERY

## 2020-06-17 PROCEDURE — 77063 BREAST TOMOSYNTHESIS BI: CPT | Performed by: SURGERY

## 2020-06-17 NOTE — TELEPHONE ENCOUNTER
LVMM per Dr. Chucho Aguilar 41: Brandy Dad number provided. From: Ric Mckoy MD   Sent: 6/17/2020  10:55 AM CDT   To: Shekhar Balderas Rns     Please let patient know that I reviewed her mammogram and looks great.  So long as she is not having any new concerns

## 2020-06-19 ENCOUNTER — LAB ENCOUNTER (OUTPATIENT)
Dept: LAB | Facility: HOSPITAL | Age: 75
End: 2020-06-19
Attending: INTERNAL MEDICINE
Payer: MEDICARE

## 2020-06-19 DIAGNOSIS — Z13.9 SCREENING FOR CONDITION: ICD-10-CM

## 2020-06-19 NOTE — IMAGING NOTE
0790:  Contacted Mrs. Zacarias Sandradhavalsherron pre-procedure. Thyroid biopsy explained and questions answered to Mrs. Zeke morgan. Instructed on arrival time and location.    Instructed to refrain from taking anticoagulating medications including: aspirin, NSAID

## 2020-06-21 ENCOUNTER — TELEPHONE (OUTPATIENT)
Dept: ENDOCRINOLOGY CLINIC | Facility: CLINIC | Age: 75
End: 2020-06-21

## 2020-06-22 ENCOUNTER — TELEPHONE (OUTPATIENT)
Dept: ENDOCRINOLOGY CLINIC | Facility: CLINIC | Age: 75
End: 2020-06-22

## 2020-06-22 ENCOUNTER — HOSPITAL ENCOUNTER (OUTPATIENT)
Dept: ULTRASOUND IMAGING | Facility: HOSPITAL | Age: 75
Discharge: HOME | End: 2020-06-22
Attending: INTERNAL MEDICINE
Payer: MEDICARE

## 2020-06-22 VITALS
HEIGHT: 60 IN | RESPIRATION RATE: 15 BRPM | BODY MASS INDEX: 32.59 KG/M2 | HEART RATE: 70 BPM | SYSTOLIC BLOOD PRESSURE: 151 MMHG | WEIGHT: 166 LBS | DIASTOLIC BLOOD PRESSURE: 83 MMHG

## 2020-06-22 DIAGNOSIS — E05.90 HYPERTHYROIDISM: Primary | ICD-10-CM

## 2020-06-22 DIAGNOSIS — E04.1 THYROID NODULE: ICD-10-CM

## 2020-06-22 PROCEDURE — 88177 CYTP FNA EVAL EA ADDL: CPT | Performed by: INTERNAL MEDICINE

## 2020-06-22 PROCEDURE — 10005 FNA BX W/US GDN 1ST LES: CPT | Performed by: INTERNAL MEDICINE

## 2020-06-22 PROCEDURE — 10006 FNA BX W/US GDN EA ADDL: CPT | Performed by: INTERNAL MEDICINE

## 2020-06-22 PROCEDURE — 88173 CYTOPATH EVAL FNA REPORT: CPT | Performed by: INTERNAL MEDICINE

## 2020-06-22 PROCEDURE — 88172 CYTP DX EVAL FNA 1ST EA SITE: CPT | Performed by: INTERNAL MEDICINE

## 2020-06-22 NOTE — TELEPHONE ENCOUNTER
FNA of nodules is benign  Repeat TSH in 2-3 months and please FU in the clinic to discuss further management  Thanks

## 2020-06-22 NOTE — IMAGING NOTE
1251 Pt arrived to ultrasound room #4     1306 Scans taken by Yang Tran, ultrasound  sonographer     1255 History taken and as follows:  ABNORMAL THYROID US AND CT SCAN. 1258 Procedure explained questions answered.     1328 Consent verified and obtained  Si

## 2020-06-24 ENCOUNTER — TELEPHONE (OUTPATIENT)
Dept: INTERNAL MEDICINE CLINIC | Facility: CLINIC | Age: 75
End: 2020-06-24

## 2020-06-24 DIAGNOSIS — E04.1 THYROID NODULE: ICD-10-CM

## 2020-06-24 DIAGNOSIS — E05.90 HYPERTHYROIDISM: Primary | ICD-10-CM

## 2020-06-24 NOTE — TELEPHONE ENCOUNTER
Patient called, stating she is not happy with Dr. Roger Anthony and is wondering if Dr. Bella Ferguson can refer her for a different Endocrinologist.

## 2020-06-24 NOTE — TELEPHONE ENCOUNTER
Patient returned call, relayed Dr. Nataly Maravilla message as shown below. Patient verbalized understanding of message and had no further questions at this time. TSH entered and appointment scheduled. Patient had no further questions at this time.

## 2020-06-25 NOTE — TELEPHONE ENCOUNTER
Patient advised of new referral, info provided for scheduling. Patient was requesting all her records from Dr Ileana Munoz be faxed over, advised to schedule appt and confirm with specialist what information they will need for review patient agreed.

## 2020-06-28 RX ORDER — LISINOPRIL 5 MG/1
TABLET ORAL
Qty: 90 TABLET | Refills: 1 | Status: SHIPPED | OUTPATIENT
Start: 2020-06-28 | End: 2020-12-28

## 2020-07-08 ENCOUNTER — TELEPHONE (OUTPATIENT)
Dept: INTERNAL MEDICINE CLINIC | Facility: CLINIC | Age: 75
End: 2020-07-08

## 2020-07-08 NOTE — TELEPHONE ENCOUNTER
Called pt, NA LVM informing pt they are due for FIT colorectal screening(ooccult blood card) and Colonoscopy. Patients preferred lab is [facility], placed in mail order for this along with letter explaining the order.

## 2020-08-20 ENCOUNTER — MED REC SCAN ONLY (OUTPATIENT)
Dept: INTERNAL MEDICINE CLINIC | Facility: CLINIC | Age: 75
End: 2020-08-20

## 2020-09-17 ENCOUNTER — TELEPHONE (OUTPATIENT)
Dept: INTERNAL MEDICINE CLINIC | Facility: CLINIC | Age: 75
End: 2020-09-17

## 2020-09-17 NOTE — TELEPHONE ENCOUNTER
Calling for advice on flu vaccine. Should she get it at [facility]'s or with our office. Advised pharmacy is fine as we don't have them yet but to call us when she gets it so we can update her chart.

## 2020-09-28 ENCOUNTER — LAB ENCOUNTER (OUTPATIENT)
Dept: LAB | Age: 75
End: 2020-09-28
Attending: INTERNAL MEDICINE
Payer: MEDICARE

## 2020-09-28 DIAGNOSIS — E05.20 TOXIC NODULAR GOITER: Primary | ICD-10-CM

## 2020-09-28 PROCEDURE — 36415 COLL VENOUS BLD VENIPUNCTURE: CPT

## 2020-09-28 PROCEDURE — 84481 FREE ASSAY (FT-3): CPT

## 2020-09-28 PROCEDURE — 84439 ASSAY OF FREE THYROXINE: CPT

## 2020-09-28 PROCEDURE — 84443 ASSAY THYROID STIM HORMONE: CPT

## 2020-09-29 ENCOUNTER — TELEPHONE (OUTPATIENT)
Dept: INTERNAL MEDICINE CLINIC | Facility: CLINIC | Age: 75
End: 2020-09-29

## 2020-09-29 NOTE — TELEPHONE ENCOUNTER
Patient calling to let you know she received the flu shot and first dose of Shingrix this month. Immunizations verified with pharmacy. Chart has been updated. Patient asking if her pneumonia vaccinations are up to date.

## 2020-11-17 RX ORDER — ATORVASTATIN CALCIUM 20 MG/1
20 TABLET, FILM COATED ORAL NIGHTLY
Qty: 90 TABLET | Refills: 1 | Status: SHIPPED | OUTPATIENT
Start: 2020-11-17 | End: 2021-05-14

## 2020-11-17 NOTE — TELEPHONE ENCOUNTER
Atorvastatin 20 mg tabs, last ordered 5/27/2020, 90 tabs, 1 refill. PENDED FOR REFILL. Last office visit 5/26/2020. Last labs (lipid) 5/27/2020.

## 2020-12-01 ENCOUNTER — LAB ENCOUNTER (OUTPATIENT)
Dept: LAB | Age: 75
End: 2020-12-01
Attending: INTERNAL MEDICINE
Payer: MEDICARE

## 2020-12-01 DIAGNOSIS — E04.9 NON-TOXIC GOITER: Primary | ICD-10-CM

## 2020-12-01 PROCEDURE — 84481 FREE ASSAY (FT-3): CPT

## 2020-12-01 PROCEDURE — 36415 COLL VENOUS BLD VENIPUNCTURE: CPT

## 2020-12-01 PROCEDURE — 84439 ASSAY OF FREE THYROXINE: CPT

## 2020-12-01 PROCEDURE — 84443 ASSAY THYROID STIM HORMONE: CPT

## 2020-12-09 ENCOUNTER — HOSPITAL ENCOUNTER (OUTPATIENT)
Dept: ULTRASOUND IMAGING | Age: 75
Discharge: HOME | End: 2020-12-09
Attending: INTERNAL MEDICINE
Payer: MEDICARE

## 2020-12-09 DIAGNOSIS — E05.20 TOXIC NODULAR GOITER: ICD-10-CM

## 2020-12-09 PROCEDURE — 76536 US EXAM OF HEAD AND NECK: CPT | Performed by: INTERNAL MEDICINE

## 2020-12-28 RX ORDER — LISINOPRIL 5 MG/1
TABLET ORAL
Qty: 90 TABLET | Refills: 1 | Status: SHIPPED | OUTPATIENT
Start: 2020-12-28 | End: 2021-05-14

## 2021-01-04 ENCOUNTER — OFFICE VISIT (OUTPATIENT)
Dept: INTERNAL MEDICINE CLINIC | Facility: CLINIC | Age: 76
End: 2021-01-04
Payer: COMMERCIAL

## 2021-01-04 VITALS
DIASTOLIC BLOOD PRESSURE: 74 MMHG | OXYGEN SATURATION: 99 % | HEART RATE: 72 BPM | TEMPERATURE: 98 F | WEIGHT: 184 LBS | RESPIRATION RATE: 17 BRPM | SYSTOLIC BLOOD PRESSURE: 120 MMHG | HEIGHT: 60 IN | BODY MASS INDEX: 36.12 KG/M2

## 2021-01-04 DIAGNOSIS — I10 ESSENTIAL HYPERTENSION: Primary | ICD-10-CM

## 2021-01-04 DIAGNOSIS — E78.5 HYPERLIPIDEMIA, UNSPECIFIED HYPERLIPIDEMIA TYPE: ICD-10-CM

## 2021-01-04 DIAGNOSIS — Z00.00 PREVENTATIVE HEALTH CARE: ICD-10-CM

## 2021-01-04 PROCEDURE — 99214 OFFICE O/P EST MOD 30 MIN: CPT | Performed by: INTERNAL MEDICINE

## 2021-01-04 PROCEDURE — 3074F SYST BP LT 130 MM HG: CPT | Performed by: INTERNAL MEDICINE

## 2021-01-04 PROCEDURE — 3008F BODY MASS INDEX DOCD: CPT | Performed by: INTERNAL MEDICINE

## 2021-01-04 PROCEDURE — 3078F DIAST BP <80 MM HG: CPT | Performed by: INTERNAL MEDICINE

## 2021-01-04 NOTE — PROGRESS NOTES
HPI:    Patient ID: Celine Fowler is a 70year old female. HPI  Patient comes in for 6-month follow-up overall she is doing okay denies any complaints today. Is taking her meds  Review of Systems   Constitutional: Negative. HENT: Negative.     Eye ARTHROSCOPY Right    • LAPAROSCOPIC INIT HERNIA REPAIR  2009    paraesophageal   • LÁZARO LOCALIZATION WIRE 1 SITE LEFT (CPT=19281)  2007   • LÁZARO LOCALIZATION WIRE 1 SITE RIGHT (OXW=57570)  2001   • OTHER      excision of left breast plate   • OTHER Right 8/2 Panel [E], normal      Occult Blood, Fecal, Immunoassay (Blue cards) [E]      Meds This Visit:  Requested Prescriptions      No prescriptions requested or ordered in this encounter       Imaging & Referrals:  None        FG#[id number]

## 2021-02-01 DIAGNOSIS — Z23 NEED FOR VACCINATION: ICD-10-CM

## 2021-02-11 ENCOUNTER — LAB ENCOUNTER (OUTPATIENT)
Dept: LAB | Age: 76
End: 2021-02-11
Attending: INTERNAL MEDICINE
Payer: MEDICARE

## 2021-02-11 LAB
CHOLEST SMN-MCNC: 157 MG/DL (ref ?–200)
HDLC SERPL-MCNC: 58 MG/DL (ref 40–59)
LDLC SERPL CALC-MCNC: 80 MG/DL (ref ?–100)
NONHDLC SERPL-MCNC: 99 MG/DL (ref ?–130)
PATIENT FASTING Y/N/NP: YES
TRIGL SERPL-MCNC: 95 MG/DL (ref 30–149)
VLDLC SERPL CALC-MCNC: 19 MG/DL (ref 0–30)

## 2021-02-11 PROCEDURE — 36415 COLL VENOUS BLD VENIPUNCTURE: CPT | Performed by: INTERNAL MEDICINE

## 2021-02-11 PROCEDURE — 80061 LIPID PANEL: CPT | Performed by: INTERNAL MEDICINE

## 2021-03-02 ENCOUNTER — TELEPHONE (OUTPATIENT)
Dept: INTERNAL MEDICINE CLINIC | Facility: CLINIC | Age: 76
End: 2021-03-02

## 2021-03-02 NOTE — TELEPHONE ENCOUNTER
Patient called regardind covid vaccine for her and spouse. Chart reviewed.  Explained although both her and spouse have orders in their chart, unfortunately, the demand for COVID-19 vaccine is much greater than the supply, and we do not have any first-d

## 2021-03-07 ENCOUNTER — IMMUNIZATION (OUTPATIENT)
Dept: LAB | Age: 76
End: 2021-03-07
Attending: HOSPITALIST
Payer: MEDICARE

## 2021-03-07 DIAGNOSIS — Z23 NEED FOR VACCINATION: Primary | ICD-10-CM

## 2021-03-07 PROCEDURE — 0001A SARSCOV2 VAC 30MCG/0.3ML IM: CPT

## 2021-03-28 ENCOUNTER — IMMUNIZATION (OUTPATIENT)
Dept: LAB | Age: 76
End: 2021-03-28
Attending: HOSPITALIST
Payer: MEDICARE

## 2021-03-28 DIAGNOSIS — Z23 NEED FOR VACCINATION: Primary | ICD-10-CM

## 2021-03-28 PROCEDURE — 0002A SARSCOV2 VAC 30MCG/0.3ML IM: CPT

## 2021-05-14 RX ORDER — ATORVASTATIN CALCIUM 20 MG/1
20 TABLET, FILM COATED ORAL NIGHTLY
Qty: 90 TABLET | Refills: 1 | Status: SHIPPED | OUTPATIENT
Start: 2021-05-14 | End: 2021-11-24

## 2021-05-14 RX ORDER — LISINOPRIL 5 MG/1
5 TABLET ORAL DAILY
Qty: 90 TABLET | Refills: 1 | Status: SHIPPED | OUTPATIENT
Start: 2021-05-14

## 2021-07-08 ENCOUNTER — TELEPHONE (OUTPATIENT)
Dept: INTERNAL MEDICINE CLINIC | Facility: CLINIC | Age: 76
End: 2021-07-08

## 2021-07-08 DIAGNOSIS — Z12.31 BREAST CANCER SCREENING BY MAMMOGRAM: Primary | ICD-10-CM

## 2021-07-08 NOTE — TELEPHONE ENCOUNTER
Patient requesting a refill on Lisinopril, informed patient there is a refill on the medication. Per patient they will not refill it.     Called [facility] pharmacy spoke with pharmacy medication has been refilled and pharmacy will notify patient once medic

## 2021-07-13 ENCOUNTER — OFFICE VISIT (OUTPATIENT)
Dept: INTERNAL MEDICINE CLINIC | Facility: CLINIC | Age: 76
End: 2021-07-13
Payer: COMMERCIAL

## 2021-07-13 VITALS
HEART RATE: 75 BPM | OXYGEN SATURATION: 99 % | WEIGHT: 186 LBS | SYSTOLIC BLOOD PRESSURE: 122 MMHG | TEMPERATURE: 98 F | HEIGHT: 60 IN | BODY MASS INDEX: 36.52 KG/M2 | RESPIRATION RATE: 17 BRPM | DIASTOLIC BLOOD PRESSURE: 80 MMHG

## 2021-07-13 DIAGNOSIS — E78.5 HYPERLIPIDEMIA, UNSPECIFIED HYPERLIPIDEMIA TYPE: ICD-10-CM

## 2021-07-13 DIAGNOSIS — E05.90 SUBCLINICAL HYPERTHYROIDISM: ICD-10-CM

## 2021-07-13 DIAGNOSIS — M85.80 OSTEOPENIA, UNSPECIFIED LOCATION: ICD-10-CM

## 2021-07-13 DIAGNOSIS — Z00.00 MEDICARE ANNUAL WELLNESS VISIT, SUBSEQUENT: Primary | ICD-10-CM

## 2021-07-13 DIAGNOSIS — Z78.0 POSTMENOPAUSAL: ICD-10-CM

## 2021-07-13 DIAGNOSIS — Z00.00 ENCOUNTER FOR ANNUAL HEALTH EXAMINATION: ICD-10-CM

## 2021-07-13 DIAGNOSIS — E05.90 HYPERTHYROIDISM: ICD-10-CM

## 2021-07-13 DIAGNOSIS — M17.0 PRIMARY OSTEOARTHRITIS OF BOTH KNEES: ICD-10-CM

## 2021-07-13 DIAGNOSIS — E04.1 THYROID NODULE: ICD-10-CM

## 2021-07-13 DIAGNOSIS — H25.13 AGE-RELATED NUCLEAR CATARACT OF BOTH EYES: ICD-10-CM

## 2021-07-13 DIAGNOSIS — F41.1 GENERALIZED ANXIETY DISORDER: ICD-10-CM

## 2021-07-13 DIAGNOSIS — Z00.00 ENCOUNTER FOR PREVENTIVE CARE: ICD-10-CM

## 2021-07-13 DIAGNOSIS — I10 ESSENTIAL HYPERTENSION: ICD-10-CM

## 2021-07-13 DIAGNOSIS — T63.461A WASP STING, ACCIDENTAL OR UNINTENTIONAL, INITIAL ENCOUNTER: ICD-10-CM

## 2021-07-13 PROCEDURE — 3079F DIAST BP 80-89 MM HG: CPT | Performed by: INTERNAL MEDICINE

## 2021-07-13 PROCEDURE — 3008F BODY MASS INDEX DOCD: CPT | Performed by: INTERNAL MEDICINE

## 2021-07-13 PROCEDURE — 3074F SYST BP LT 130 MM HG: CPT | Performed by: INTERNAL MEDICINE

## 2021-07-13 PROCEDURE — 96160 PT-FOCUSED HLTH RISK ASSMT: CPT | Performed by: INTERNAL MEDICINE

## 2021-07-13 PROCEDURE — 99397 PER PM REEVAL EST PAT 65+ YR: CPT | Performed by: INTERNAL MEDICINE

## 2021-07-13 PROCEDURE — G0439 PPPS, SUBSEQ VISIT: HCPCS | Performed by: INTERNAL MEDICINE

## 2021-07-13 RX ORDER — EPINEPHRINE 0.3 MG/.3ML
0.3 INJECTION SUBCUTANEOUS ONCE
Qty: 1 EACH | Refills: 0 | Status: SHIPPED | OUTPATIENT
Start: 2021-07-13 | End: 2021-07-13

## 2021-07-13 RX ORDER — METHIMAZOLE 5 MG/1
2.5 TABLET ORAL EVERY OTHER DAY
COMMUNITY
Start: 2021-04-05 | End: 2021-08-03

## 2021-07-19 NOTE — PATIENT INSTRUCTIONS
Chester Carroll's SCREENING SCHEDULE   Tests on this list are recommended by your physician but may not be covered, or covered at this frequency, by your insurer. Please check with your insurance carrier before scheduling to verify coverage.    PREVENT recommendations at this time   Pap and Pelvic    Pap   Covered every 2 years for women at normal risk;  Annually if at high risk -  No recommendations at this time    Chlamydia Annually if high risk -  No recommendations at this time   Screening Mammogram [employer]. http://[email or web address] [email or web address] [place]/public/books/advin.htm  A link to the [employer]. This site has a lot of good information including definitions of the different types of Advance Directives.  It als

## 2021-07-19 NOTE — PROGRESS NOTES
HPI:   Yamile Yan is a 70year old female who presents for a Medicare Subsequent Annual Wellness visit (Pt already had Initial Annual Wellness).     She comes in for Medicare annual overall doing okay denies any new complaints follows with endocrine o Debra Saez (Physical Therapy)  Vicenta Tovar, PT as Physical Therapist (Physical Therapy)  Adithya Martin PTA as Physical Therapy Assistant (Physical Therapy)    Patient Active Problem List:     Other seborrheic keratosis     Actinic keratosis     Hypertension, whether generalized or localized, unspecified site, Osteoporosis, senile (7/14/2017), Postmenopausal bleeding, Torn meniscus, Unspecified essential hypertension, and Visual impairment.     She  has a past surgical history that includes cholecystectomy; d&c time: No   I have trouble understanding things on the TV: No I have to strain to understand conversations: No   I have to worry about missing the telephone ring or doorbell: No I have trouble hearing conversations in a noisy background such as a crowded ro oriented to person, place, and time. Deep Tendon Reflexes: Reflexes are normal and symmetric. Psychiatric:         Behavior: Behavior normal.         Thought Content:  Thought content normal.         Judgment: Judgment normal.         Vaccination His of allergic reaction will refill the EpiPen  -     CBC WITH DIFFERENTIAL WITH PLATELET  -     COMP METABOLIC PANEL (14)  -     LIPID PANEL  -     TSH W REFLEX TO FREE T4  -     URINALYSIS, ROUTINE    Encounter for preventive care GI for screening colonosco list are recommended by your physician but may not be covered, or covered at this frequency, by your insurer. Please check with your insurance carrier before scheduling to verify coverage.    PREVENTATIVE SERVICES FREQUENCY &  COVERAGE DETAILS LAST COMPLE Covered every 2 years for women at normal risk;  Annually if at high risk -  No recommendations at this time    Chlamydia Annually if high risk -  No recommendations at this time   Screening Mammogram    Mammogram     Recommend annually for all female patie

## 2021-07-28 ENCOUNTER — LAB ENCOUNTER (OUTPATIENT)
Dept: LAB | Age: 76
End: 2021-07-28
Attending: INTERNAL MEDICINE
Payer: MEDICARE

## 2021-07-28 LAB
ALBUMIN SERPL-MCNC: 3.4 G/DL (ref 3.4–5)
ALBUMIN/GLOB SERPL: 1 {RATIO} (ref 1–2)
ALP LIVER SERPL-CCNC: 103 U/L
ALT SERPL-CCNC: 25 U/L
ANION GAP SERPL CALC-SCNC: 8 MMOL/L (ref 0–18)
AST SERPL-CCNC: 17 U/L (ref 15–37)
BASOPHILS # BLD AUTO: 0.07 X10(3) UL (ref 0–0.2)
BASOPHILS NFR BLD AUTO: 1.1 %
BILIRUB SERPL-MCNC: 0.5 MG/DL (ref 0.1–2)
BILIRUB UR QL: NEGATIVE
BUN BLD-MCNC: 21 MG/DL (ref 7–18)
BUN/CREAT SERPL: 26.9 (ref 10–20)
CALCIUM BLD-MCNC: 9.4 MG/DL (ref 8.5–10.1)
CHLORIDE SERPL-SCNC: 109 MMOL/L (ref 98–112)
CHOLEST SMN-MCNC: 158 MG/DL (ref ?–200)
CO2 SERPL-SCNC: 27 MMOL/L (ref 21–32)
COLOR UR: YELLOW
CREAT BLD-MCNC: 0.78 MG/DL
DEPRECATED RDW RBC AUTO: 44.2 FL (ref 35.1–46.3)
EOSINOPHIL # BLD AUTO: 0.22 X10(3) UL (ref 0–0.7)
EOSINOPHIL NFR BLD AUTO: 3.4 %
ERYTHROCYTE [DISTWIDTH] IN BLOOD BY AUTOMATED COUNT: 13.8 % (ref 11–15)
GLOBULIN PLAS-MCNC: 3.3 G/DL (ref 2.8–4.4)
GLUCOSE BLD-MCNC: 107 MG/DL (ref 70–99)
GLUCOSE UR-MCNC: NEGATIVE MG/DL
HCT VFR BLD AUTO: 41.2 %
HDLC SERPL-MCNC: 48 MG/DL (ref 40–59)
HGB BLD-MCNC: 13.2 G/DL
HGB UR QL STRIP.AUTO: NEGATIVE
IMM GRANULOCYTES # BLD AUTO: 0.01 X10(3) UL (ref 0–1)
IMM GRANULOCYTES NFR BLD: 0.2 %
KETONES UR-MCNC: NEGATIVE MG/DL
LDLC SERPL CALC-MCNC: 96 MG/DL (ref ?–100)
LYMPHOCYTES # BLD AUTO: 1.44 X10(3) UL (ref 1–4)
LYMPHOCYTES NFR BLD AUTO: 22.3 %
M PROTEIN MFR SERPL ELPH: 6.7 G/DL (ref 6.4–8.2)
MCH RBC QN AUTO: 28 PG (ref 26–34)
MCHC RBC AUTO-ENTMCNC: 32 G/DL (ref 31–37)
MCV RBC AUTO: 87.3 FL
MONOCYTES # BLD AUTO: 0.4 X10(3) UL (ref 0.1–1)
MONOCYTES NFR BLD AUTO: 6.2 %
NEUTROPHILS # BLD AUTO: 4.31 X10 (3) UL (ref 1.5–7.7)
NEUTROPHILS # BLD AUTO: 4.31 X10(3) UL (ref 1.5–7.7)
NEUTROPHILS NFR BLD AUTO: 66.8 %
NITRITE UR QL STRIP.AUTO: NEGATIVE
NONHDLC SERPL-MCNC: 110 MG/DL (ref ?–130)
OSMOLALITY SERPL CALC.SUM OF ELEC: 301 MOSM/KG (ref 275–295)
PATIENT FASTING Y/N/NP: YES
PATIENT FASTING Y/N/NP: YES
PH UR: 5 [PH] (ref 5–8)
PLATELET # BLD AUTO: 242 10(3)UL (ref 150–450)
POTASSIUM SERPL-SCNC: 4.3 MMOL/L (ref 3.5–5.1)
PROT UR-MCNC: NEGATIVE MG/DL
RBC # BLD AUTO: 4.72 X10(6)UL
SODIUM SERPL-SCNC: 144 MMOL/L (ref 136–145)
SP GR UR STRIP: 1.02 (ref 1–1.03)
T3FREE SERPL-MCNC: 3.23 PG/ML (ref 2.4–4.2)
T4 FREE SERPL-MCNC: 1 NG/DL (ref 0.8–1.7)
TRIGL SERPL-MCNC: 74 MG/DL (ref 30–149)
TSI SER-ACNC: 0.29 MIU/ML (ref 0.36–3.74)
UROBILINOGEN UR STRIP-ACNC: <2
VLDLC SERPL CALC-MCNC: 12 MG/DL (ref 0–30)
WBC # BLD AUTO: 6.5 X10(3) UL (ref 4–11)

## 2021-07-28 PROCEDURE — 80053 COMPREHEN METABOLIC PANEL: CPT | Performed by: INTERNAL MEDICINE

## 2021-07-28 PROCEDURE — 81001 URINALYSIS AUTO W/SCOPE: CPT | Performed by: INTERNAL MEDICINE

## 2021-07-28 PROCEDURE — 84481 FREE ASSAY (FT-3): CPT | Performed by: INTERNAL MEDICINE

## 2021-07-28 PROCEDURE — 84439 ASSAY OF FREE THYROXINE: CPT | Performed by: INTERNAL MEDICINE

## 2021-07-28 PROCEDURE — 84443 ASSAY THYROID STIM HORMONE: CPT | Performed by: INTERNAL MEDICINE

## 2021-07-28 PROCEDURE — 36415 COLL VENOUS BLD VENIPUNCTURE: CPT | Performed by: INTERNAL MEDICINE

## 2021-07-28 PROCEDURE — 85025 COMPLETE CBC W/AUTO DIFF WBC: CPT | Performed by: INTERNAL MEDICINE

## 2021-07-28 PROCEDURE — 80061 LIPID PANEL: CPT | Performed by: INTERNAL MEDICINE

## 2021-07-31 ENCOUNTER — HOSPITAL ENCOUNTER (OUTPATIENT)
Dept: BONE DENSITY | Age: 76
Discharge: HOME | End: 2021-07-31
Attending: INTERNAL MEDICINE
Payer: MEDICARE

## 2021-07-31 ENCOUNTER — HOSPITAL ENCOUNTER (OUTPATIENT)
Dept: MAMMOGRAPHY | Age: 76
Discharge: HOME | End: 2021-07-31
Attending: INTERNAL MEDICINE
Payer: MEDICARE

## 2021-07-31 DIAGNOSIS — Z78.0 POSTMENOPAUSAL: ICD-10-CM

## 2021-07-31 DIAGNOSIS — Z12.31 BREAST CANCER SCREENING BY MAMMOGRAM: ICD-10-CM

## 2021-07-31 PROCEDURE — 77080 DXA BONE DENSITY AXIAL: CPT | Performed by: INTERNAL MEDICINE

## 2021-07-31 PROCEDURE — 77063 BREAST TOMOSYNTHESIS BI: CPT | Performed by: INTERNAL MEDICINE

## 2021-07-31 PROCEDURE — 77067 SCR MAMMO BI INCL CAD: CPT | Performed by: INTERNAL MEDICINE

## 2021-08-03 RX ORDER — METHIMAZOLE 5 MG/1
2.5 TABLET ORAL DAILY
Qty: 15 TABLET | Refills: 0 | Status: SHIPPED | OUTPATIENT
Start: 2021-08-03 | End: 2021-09-02

## 2021-08-20 RX ORDER — MONTELUKAST SODIUM 10 MG/1
TABLET ORAL
Qty: 30 TABLET | Refills: 0 | OUTPATIENT
Start: 2021-08-20

## 2021-10-07 ENCOUNTER — TELEPHONE (OUTPATIENT)
Dept: INTERNAL MEDICINE CLINIC | Facility: CLINIC | Age: 76
End: 2021-10-07

## 2021-10-07 NOTE — TELEPHONE ENCOUNTER
Pt called to relay she had the booster vaccine, asking if she need the Flu shot advised yes it's annually, Pt verbalized understanding

## 2021-11-04 ENCOUNTER — MED REC SCAN ONLY (OUTPATIENT)
Dept: INTERNAL MEDICINE CLINIC | Facility: CLINIC | Age: 76
End: 2021-11-04

## 2021-11-11 ENCOUNTER — TELEPHONE (OUTPATIENT)
Dept: INTERNAL MEDICINE CLINIC | Facility: CLINIC | Age: 76
End: 2021-11-11

## 2021-11-11 NOTE — TELEPHONE ENCOUNTER
Spoke with patient ( verified)--reports she saw her endocrinologist 2021 and he mentioned she may need \"heart ultrasound because he heard a heart murmur. He said he was going to send something to Dr. Ct Mccray. \"    Patient denies acute chest pain or

## 2021-11-12 ENCOUNTER — OFFICE VISIT (OUTPATIENT)
Dept: INTERNAL MEDICINE CLINIC | Facility: CLINIC | Age: 76
End: 2021-11-12
Payer: COMMERCIAL

## 2021-11-12 VITALS
WEIGHT: 181 LBS | DIASTOLIC BLOOD PRESSURE: 72 MMHG | TEMPERATURE: 98 F | BODY MASS INDEX: 35.53 KG/M2 | RESPIRATION RATE: 17 BRPM | SYSTOLIC BLOOD PRESSURE: 114 MMHG | HEIGHT: 60 IN | HEART RATE: 75 BPM | OXYGEN SATURATION: 98 %

## 2021-11-12 DIAGNOSIS — R00.2 PALPITATIONS: ICD-10-CM

## 2021-11-12 DIAGNOSIS — R01.1 HEART MURMUR: Primary | ICD-10-CM

## 2021-11-12 DIAGNOSIS — R94.31 ABNORMAL EKG: ICD-10-CM

## 2021-11-12 PROCEDURE — 93000 ELECTROCARDIOGRAM COMPLETE: CPT | Performed by: INTERNAL MEDICINE

## 2021-11-12 PROCEDURE — 99214 OFFICE O/P EST MOD 30 MIN: CPT | Performed by: INTERNAL MEDICINE

## 2021-11-12 PROCEDURE — 3078F DIAST BP <80 MM HG: CPT | Performed by: INTERNAL MEDICINE

## 2021-11-12 PROCEDURE — 3074F SYST BP LT 130 MM HG: CPT | Performed by: INTERNAL MEDICINE

## 2021-11-12 PROCEDURE — 3008F BODY MASS INDEX DOCD: CPT | Performed by: INTERNAL MEDICINE

## 2021-11-12 RX ORDER — METHIMAZOLE 5 MG/1
2.5 TABLET ORAL
COMMUNITY
Start: 2021-11-02

## 2021-11-12 NOTE — PROGRESS NOTES
Subjective:   Patient ID: Lena Aguirre is a 78year old female.     HPI  Patient comes in today for follow-up she had seen the endocrinologist for hyperthyroidism was told that he heard a murmur in her heart patient denies any shortness of breath but sh General: Bowel sounds are normal.      Palpations: Abdomen is soft. Genitourinary:     Vagina: Normal.   Musculoskeletal:         General: Normal range of motion. Cervical back: Normal range of motion and neck supple.    Skin:     General: Skin

## 2021-11-24 RX ORDER — ATORVASTATIN CALCIUM 20 MG/1
20 TABLET, FILM COATED ORAL NIGHTLY
Qty: 90 TABLET | Refills: 1 | Status: SHIPPED | OUTPATIENT
Start: 2021-11-24

## 2021-11-24 NOTE — TELEPHONE ENCOUNTER
Refill passed per [facility] protocol.    Requested Prescriptions   Pending Prescriptions Disp Refills    ATORVASTATIN 20 MG Oral Tab [Pharmacy Med Name: ATORVASTATIN 20MG TABLETS] 90 tablet 1     Sig: TAKE 1 TABLET(20 MG) BY MOUTH EVERY NIGHT        C

## 2021-11-30 ENCOUNTER — HOSPITAL ENCOUNTER (OUTPATIENT)
Dept: CV DIAGNOSTICS | Facility: HOSPITAL | Age: 76
Discharge: HOME | End: 2021-11-30
Attending: INTERNAL MEDICINE
Payer: MEDICARE

## 2021-11-30 DIAGNOSIS — R94.31 ABNORMAL EKG: ICD-10-CM

## 2021-11-30 DIAGNOSIS — R00.2 PALPITATIONS: ICD-10-CM

## 2021-11-30 DIAGNOSIS — R01.1 HEART MURMUR: ICD-10-CM

## 2021-11-30 PROCEDURE — 93306 TTE W/DOPPLER COMPLETE: CPT | Performed by: INTERNAL MEDICINE

## 2021-12-03 ENCOUNTER — LAB ENCOUNTER (OUTPATIENT)
Dept: LAB | Age: 76
End: 2021-12-03
Attending: INTERNAL MEDICINE
Payer: MEDICARE

## 2021-12-03 DIAGNOSIS — E05.90 SUBCLINICAL HYPERTHYROIDISM: Primary | ICD-10-CM

## 2021-12-03 PROCEDURE — 84481 FREE ASSAY (FT-3): CPT

## 2021-12-03 PROCEDURE — 84439 ASSAY OF FREE THYROXINE: CPT

## 2021-12-03 PROCEDURE — 36415 COLL VENOUS BLD VENIPUNCTURE: CPT

## 2021-12-03 PROCEDURE — 84443 ASSAY THYROID STIM HORMONE: CPT

## 2021-12-24 ENCOUNTER — MED REC SCAN ONLY (OUTPATIENT)
Dept: INTERNAL MEDICINE CLINIC | Facility: CLINIC | Age: 76
End: 2021-12-24

## 2022-01-13 ENCOUNTER — HOSPITAL ENCOUNTER (OUTPATIENT)
Dept: ULTRASOUND IMAGING | Age: 77
Discharge: HOME | End: 2022-01-13
Attending: INTERNAL MEDICINE
Payer: MEDICARE

## 2022-01-13 DIAGNOSIS — E05.20 TOXIC NODULAR GOITER: ICD-10-CM

## 2022-01-13 PROCEDURE — 76536 US EXAM OF HEAD AND NECK: CPT | Performed by: INTERNAL MEDICINE

## 2022-03-07 ENCOUNTER — TELEPHONE (OUTPATIENT)
Dept: INTERNAL MEDICINE CLINIC | Facility: CLINIC | Age: 77
End: 2022-03-07

## 2022-03-24 ENCOUNTER — NURSE TRIAGE (OUTPATIENT)
Dept: INTERNAL MEDICINE CLINIC | Facility: CLINIC | Age: 77
End: 2022-03-24

## 2022-03-24 ENCOUNTER — HOSPITAL ENCOUNTER (OUTPATIENT)
Age: 77
Discharge: HOME | End: 2022-03-24
Attending: EMERGENCY MEDICINE
Payer: MEDICARE

## 2022-03-24 VITALS
DIASTOLIC BLOOD PRESSURE: 82 MMHG | RESPIRATION RATE: 18 BRPM | HEART RATE: 96 BPM | OXYGEN SATURATION: 97 % | TEMPERATURE: 98 F | SYSTOLIC BLOOD PRESSURE: 142 MMHG

## 2022-03-24 DIAGNOSIS — U07.1 COVID-19: Primary | ICD-10-CM

## 2022-03-24 LAB
AMB EXT COVID-19 RESULT: DETECTED
S PYO AG THROAT QL: NEGATIVE
SARS-COV-2 RNA RESP QL NAA+PROBE: DETECTED

## 2022-03-24 PROCEDURE — 87880 STREP A ASSAY W/OPTIC: CPT

## 2022-03-24 PROCEDURE — 99213 OFFICE O/P EST LOW 20 MIN: CPT

## 2022-03-24 PROCEDURE — 99214 OFFICE O/P EST MOD 30 MIN: CPT

## 2022-03-24 NOTE — TELEPHONE ENCOUNTER
Spoke with Darrion Ng at [facility]--they do give AB infusions at any of [facility], but patient has to be seen at [facility] for visit--provider there decides if patient meets criteria for AB infusion. Also patients need to be at [facility] by 5:30 p.m., as infusion takes time and patient needs to be monitored post-infusion for any adverse reactions. Spoke with patient ( verified) and relayed Dr. Radha Bloom message below and  information above--patient verbalizes understanding and agreement and will go to [facility] now.     Patient will keep tomorrow's virtual visit for f/u with Dr. Carlos Mansfield as Belma Collet

## 2022-03-24 NOTE — TELEPHONE ENCOUNTER
Pt is a high risk so I would suggest she goes to immediate care to get the AB infusion, please let me know what pt decides

## 2022-03-24 NOTE — ED INITIAL ASSESSMENT (HPI)
Sore throat, congestion, and body aches started yesterday. Home covid test +. C/o chills and fever but did not take temp. Feels better today. Presents today inquiring about antibody infusion.

## 2022-03-25 ENCOUNTER — VIRTUAL PHONE E/M (OUTPATIENT)
Dept: INTERNAL MEDICINE CLINIC | Facility: CLINIC | Age: 77
End: 2022-03-25
Payer: COMMERCIAL

## 2022-03-25 DIAGNOSIS — U07.1 COVID-19 VIRUS INFECTION: Primary | ICD-10-CM

## 2022-03-25 PROCEDURE — 99443 PHONE E/M BY PHYS 21-30 MIN: CPT | Performed by: INTERNAL MEDICINE

## 2022-03-25 NOTE — PROGRESS NOTES
Virtual Telephone Check-In    Tomasa Naranjo verbally consents to a Virtual/Telephone Check-In visit on 03/25/22. Patient has been referred to the [facility] website at [email or web address] to review the yearly Consent to Treat document. Patient understands and accepts financial responsibility for any deductible, co-insurance and/or co-pays associated with this service. Duration of the service: 22 minutes      Summary of topics discussed: Patient called today to follow-up on her Covid symptoms diagnosed yesterday after she woke up feeling really sick fatigued body ache sore throat similar to when she had Covid over a year ago. Patient was with a friend over the weekend who had some congestion but they thought she has allergies.   Due to patients medical history she was referred to go to immediate care and get monoclonal antibody infusion she did that yesterday this morning she says she is feeling a lot better she also has a pulse ox and she is checking her oxygen  Denies any chest pain or shortness of breath      1. COVID-19 virus infection    We will continue to monitor symptoms check oxygen any shortness of breath or chest pain let us know or go to ER      iRc Arzola MD

## 2022-04-06 ENCOUNTER — TELEPHONE (OUTPATIENT)
Dept: INTERNAL MEDICINE CLINIC | Facility: CLINIC | Age: 77
End: 2022-04-06

## 2022-05-30 RX ORDER — ATORVASTATIN CALCIUM 20 MG/1
20 TABLET, FILM COATED ORAL NIGHTLY
Qty: 90 TABLET | Refills: 1 | Status: SHIPPED | OUTPATIENT
Start: 2022-05-30 | End: 2022-12-18

## 2022-05-30 NOTE — TELEPHONE ENCOUNTER
Refill passed per [facility] protocol.       Requested Prescriptions   Pending Prescriptions Disp Refills    ATORVASTATIN 20 MG Oral Tab [Pharmacy Med Name: ATORVASTATIN 20MG TABLETS] 90 tablet 1     Sig: TAKE 1 TABLET(20 MG) BY MOUTH EVERY NIGHT        Cholesterol Medication Protocol Passed - 5/30/2022  3:48 AM        Passed - ALT in past 12 months        Passed - LDL in past 12 months        Passed - Last ALT < 80       Lab Results   Component Value Date    ALT 25 07/28/2021             Passed - Last LDL < 130     Lab Results   Component Value Date    LDL 96 07/28/2021               Passed - Appointment in past 12 or next 3 months                   Recent Outpatient Visits              2 months ago COVID-19 virus infection    Erin Camarena MD    [facility]    6 months ago Heart murmur    Erin Camarena MD    Office Visit    10 months ago [employer] visit, subsequent    Erin Camarena MD    Office Visit    1 year ago Essential hypertension    Emanuel Isbell, Jazzmine Jovel MD    Office Visit    2 years ago Atul Otto annual wellness visit, subsequent    [place] [employer] [email or web address], [address], Mya Roman MD    Office Visit

## 2022-05-31 ENCOUNTER — TELEPHONE (OUTPATIENT)
Dept: INTERNAL MEDICINE CLINIC | Facility: CLINIC | Age: 77
End: 2022-05-31

## 2022-06-13 ENCOUNTER — OFFICE VISIT (OUTPATIENT)
Dept: INTERNAL MEDICINE CLINIC | Facility: CLINIC | Age: 77
End: 2022-06-13
Payer: COMMERCIAL

## 2022-06-13 VITALS
HEIGHT: 60 IN | WEIGHT: 180 LBS | BODY MASS INDEX: 35.34 KG/M2 | OXYGEN SATURATION: 98 % | SYSTOLIC BLOOD PRESSURE: 136 MMHG | HEART RATE: 65 BPM | TEMPERATURE: 98 F | DIASTOLIC BLOOD PRESSURE: 81 MMHG

## 2022-06-13 DIAGNOSIS — Z00.00 ANNUAL PHYSICAL EXAM: ICD-10-CM

## 2022-06-13 DIAGNOSIS — Z00.00 ENCOUNTER FOR ANNUAL HEALTH EXAMINATION: Primary | ICD-10-CM

## 2022-06-13 DIAGNOSIS — I77.1 TORTUOUS AORTA (HCC): ICD-10-CM

## 2022-06-13 DIAGNOSIS — Z12.11 SCREENING FOR COLON CANCER: ICD-10-CM

## 2022-06-13 DIAGNOSIS — Z12.31 ENCOUNTER FOR SCREENING MAMMOGRAM FOR MALIGNANT NEOPLASM OF BREAST: ICD-10-CM

## 2022-06-13 DIAGNOSIS — J44.9 ASTHMA WITH COPD (CHRONIC OBSTRUCTIVE PULMONARY DISEASE) (HCC): ICD-10-CM

## 2022-06-13 PROBLEM — Z86.19 HISTORY OF SHINGLES: Status: RESOLVED | Noted: 2019-04-02 | Resolved: 2022-06-13

## 2022-06-16 PROBLEM — E66.9 CLASS 2 OBESITY IN ADULT: Status: ACTIVE | Noted: 2022-06-16

## 2022-06-16 PROBLEM — E66.812 CLASS 2 OBESITY IN ADULT: Status: ACTIVE | Noted: 2022-06-16

## 2022-08-15 ENCOUNTER — HOSPITAL ENCOUNTER (OUTPATIENT)
Dept: MAMMOGRAPHY | Age: 77
Discharge: HOME | End: 2022-08-15
Attending: INTERNAL MEDICINE
Payer: MEDICARE

## 2022-08-15 DIAGNOSIS — Z12.31 ENCOUNTER FOR SCREENING MAMMOGRAM FOR MALIGNANT NEOPLASM OF BREAST: ICD-10-CM

## 2022-08-15 PROCEDURE — 77067 SCR MAMMO BI INCL CAD: CPT | Performed by: INTERNAL MEDICINE

## 2022-08-15 PROCEDURE — 77063 BREAST TOMOSYNTHESIS BI: CPT | Performed by: INTERNAL MEDICINE

## 2022-08-19 ENCOUNTER — LAB ENCOUNTER (OUTPATIENT)
Dept: LAB | Age: 77
End: 2022-08-19
Attending: INTERNAL MEDICINE
Payer: MEDICARE

## 2022-08-19 DIAGNOSIS — E05.90 SUBCLINICAL HYPERTHYROIDISM: ICD-10-CM

## 2022-08-19 LAB
ALBUMIN SERPL-MCNC: 3.5 G/DL (ref 3.4–5)
ALBUMIN/GLOB SERPL: 1.1 {RATIO} (ref 1–2)
ALP LIVER SERPL-CCNC: 109 U/L
ALT SERPL-CCNC: 28 U/L
ANION GAP SERPL CALC-SCNC: 4 MMOL/L (ref 0–18)
AST SERPL-CCNC: 23 U/L (ref 15–37)
BASOPHILS # BLD AUTO: 0.06 X10(3) UL (ref 0–0.2)
BASOPHILS NFR BLD AUTO: 0.9 %
BILIRUB SERPL-MCNC: 0.6 MG/DL (ref 0.1–2)
BUN BLD-MCNC: 19 MG/DL (ref 7–18)
BUN/CREAT SERPL: 23.2 (ref 10–20)
CALCIUM BLD-MCNC: 9.2 MG/DL (ref 8.5–10.1)
CHLORIDE SERPL-SCNC: 108 MMOL/L (ref 98–112)
CHOLEST SERPL-MCNC: 145 MG/DL (ref ?–200)
CO2 SERPL-SCNC: 30 MMOL/L (ref 21–32)
CREAT BLD-MCNC: 0.82 MG/DL
DEPRECATED RDW RBC AUTO: 46.4 FL (ref 35.1–46.3)
EOSINOPHIL # BLD AUTO: 0.24 X10(3) UL (ref 0–0.7)
EOSINOPHIL NFR BLD AUTO: 3.5 %
ERYTHROCYTE [DISTWIDTH] IN BLOOD BY AUTOMATED COUNT: 13.8 % (ref 11–15)
FASTING PATIENT LIPID ANSWER: YES
FASTING STATUS PATIENT QL REPORTED: YES
GFR SERPLBLD BASED ON 1.73 SQ M-ARVRAT: 75 ML/MIN/1.73M2 (ref 60–?)
GLOBULIN PLAS-MCNC: 3.3 G/DL (ref 2.8–4.4)
GLUCOSE BLD-MCNC: 98 MG/DL (ref 70–99)
HCT VFR BLD AUTO: 42.5 %
HDLC SERPL-MCNC: 56 MG/DL (ref 40–59)
HGB BLD-MCNC: 13.4 G/DL
IMM GRANULOCYTES # BLD AUTO: 0.05 X10(3) UL (ref 0–1)
IMM GRANULOCYTES NFR BLD: 0.7 %
LDLC SERPL CALC-MCNC: 74 MG/DL (ref ?–100)
LYMPHOCYTES # BLD AUTO: 1.35 X10(3) UL (ref 1–4)
LYMPHOCYTES NFR BLD AUTO: 19.7 %
MCH RBC QN AUTO: 28.8 PG (ref 26–34)
MCHC RBC AUTO-ENTMCNC: 31.5 G/DL (ref 31–37)
MCV RBC AUTO: 91.2 FL
MONOCYTES # BLD AUTO: 0.42 X10(3) UL (ref 0.1–1)
MONOCYTES NFR BLD AUTO: 6.1 %
NEUTROPHILS # BLD AUTO: 4.72 X10 (3) UL (ref 1.5–7.7)
NEUTROPHILS # BLD AUTO: 4.72 X10(3) UL (ref 1.5–7.7)
NEUTROPHILS NFR BLD AUTO: 69.1 %
NONHDLC SERPL-MCNC: 89 MG/DL (ref ?–130)
OSMOLALITY SERPL CALC.SUM OF ELEC: 296 MOSM/KG (ref 275–295)
PLATELET # BLD AUTO: 269 10(3)UL (ref 150–450)
POTASSIUM SERPL-SCNC: 3.8 MMOL/L (ref 3.5–5.1)
PROT SERPL-MCNC: 6.8 G/DL (ref 6.4–8.2)
RBC # BLD AUTO: 4.66 X10(6)UL
SODIUM SERPL-SCNC: 142 MMOL/L (ref 136–145)
T3FREE SERPL-MCNC: 3.01 PG/ML (ref 2.4–4.2)
T4 FREE SERPL-MCNC: 1.1 NG/DL (ref 0.8–1.7)
TRIGL SERPL-MCNC: 75 MG/DL (ref 30–149)
TSI SER-ACNC: 0.73 MIU/ML (ref 0.36–3.74)
VLDLC SERPL CALC-MCNC: 12 MG/DL (ref 0–30)
WBC # BLD AUTO: 6.8 X10(3) UL (ref 4–11)

## 2022-08-19 PROCEDURE — 84443 ASSAY THYROID STIM HORMONE: CPT

## 2022-08-19 PROCEDURE — 36415 COLL VENOUS BLD VENIPUNCTURE: CPT | Performed by: INTERNAL MEDICINE

## 2022-08-19 PROCEDURE — 80053 COMPREHEN METABOLIC PANEL: CPT | Performed by: INTERNAL MEDICINE

## 2022-08-19 PROCEDURE — 80061 LIPID PANEL: CPT | Performed by: INTERNAL MEDICINE

## 2022-08-19 PROCEDURE — 84439 ASSAY OF FREE THYROXINE: CPT

## 2022-08-19 PROCEDURE — 85025 COMPLETE CBC W/AUTO DIFF WBC: CPT | Performed by: INTERNAL MEDICINE

## 2022-08-19 PROCEDURE — 84481 FREE ASSAY (FT-3): CPT

## 2023-02-16 ENCOUNTER — TELEPHONE (OUTPATIENT)
Dept: INTERNAL MEDICINE CLINIC | Facility: CLINIC | Age: 78
End: 2023-02-16

## 2023-04-05 ENCOUNTER — TELEPHONE (OUTPATIENT)
Dept: INTERNAL MEDICINE CLINIC | Facility: CLINIC | Age: 78
End: 2023-04-05

## 2023-05-03 ENCOUNTER — NURSE TRIAGE (OUTPATIENT)
Dept: INTERNAL MEDICINE CLINIC | Facility: CLINIC | Age: 78
End: 2023-05-03

## 2023-05-04 ENCOUNTER — OFFICE VISIT (OUTPATIENT)
Dept: INTERNAL MEDICINE CLINIC | Facility: CLINIC | Age: 78
End: 2023-05-04

## 2023-05-04 VITALS
TEMPERATURE: 98 F | HEIGHT: 60 IN | WEIGHT: 170 LBS | SYSTOLIC BLOOD PRESSURE: 118 MMHG | HEART RATE: 78 BPM | RESPIRATION RATE: 19 BRPM | BODY MASS INDEX: 33.38 KG/M2 | DIASTOLIC BLOOD PRESSURE: 68 MMHG | OXYGEN SATURATION: 98 %

## 2023-05-04 DIAGNOSIS — K11.7 DROOLING: ICD-10-CM

## 2023-05-04 DIAGNOSIS — Z00.00 ENCOUNTER FOR PREVENTIVE CARE: ICD-10-CM

## 2023-05-04 DIAGNOSIS — Z86.73 HISTORY OF TIA (TRANSIENT ISCHEMIC ATTACK): Primary | ICD-10-CM

## 2023-05-04 DIAGNOSIS — R41.89 BRAIN FOG: ICD-10-CM

## 2023-05-04 PROCEDURE — 1159F MED LIST DOCD IN RCRD: CPT | Performed by: INTERNAL MEDICINE

## 2023-05-04 PROCEDURE — 3078F DIAST BP <80 MM HG: CPT | Performed by: INTERNAL MEDICINE

## 2023-05-04 PROCEDURE — 3008F BODY MASS INDEX DOCD: CPT | Performed by: INTERNAL MEDICINE

## 2023-05-04 PROCEDURE — 1160F RVW MEDS BY RX/DR IN RCRD: CPT | Performed by: INTERNAL MEDICINE

## 2023-05-04 PROCEDURE — 1126F AMNT PAIN NOTED NONE PRSNT: CPT | Performed by: INTERNAL MEDICINE

## 2023-05-04 PROCEDURE — 3074F SYST BP LT 130 MM HG: CPT | Performed by: INTERNAL MEDICINE

## 2023-05-04 PROCEDURE — 99214 OFFICE O/P EST MOD 30 MIN: CPT | Performed by: INTERNAL MEDICINE

## 2023-05-06 ENCOUNTER — LAB ENCOUNTER (OUTPATIENT)
Dept: LAB | Age: 78
End: 2023-05-06
Attending: INTERNAL MEDICINE
Payer: MEDICARE

## 2023-05-06 DIAGNOSIS — E05.20 THYROTOXICOSIS WITH TOXIC MULTINODULAR GOITER AND WITHOUT THYROID STORM: Primary | ICD-10-CM

## 2023-05-06 DIAGNOSIS — Z00.00 MEDICARE ANNUAL WELLNESS VISIT, SUBSEQUENT: ICD-10-CM

## 2023-05-06 LAB
ALBUMIN SERPL-MCNC: 3.7 G/DL (ref 3.4–5)
ALBUMIN/GLOB SERPL: 1.1 {RATIO} (ref 1–2)
ALP LIVER SERPL-CCNC: 106 U/L
ALT SERPL-CCNC: 29 U/L
ANION GAP SERPL CALC-SCNC: 5 MMOL/L (ref 0–18)
AST SERPL-CCNC: 22 U/L (ref 15–37)
BASOPHILS # BLD AUTO: 0.05 X10(3) UL (ref 0–0.2)
BASOPHILS NFR BLD AUTO: 0.8 %
BILIRUB SERPL-MCNC: 0.5 MG/DL (ref 0.1–2)
BILIRUB UR QL: NEGATIVE
BUN BLD-MCNC: 24 MG/DL (ref 7–18)
BUN/CREAT SERPL: 27.6 (ref 10–20)
CALCIUM BLD-MCNC: 9.7 MG/DL (ref 8.5–10.1)
CHLORIDE SERPL-SCNC: 108 MMOL/L (ref 98–112)
CHOLEST SERPL-MCNC: 141 MG/DL (ref ?–200)
CO2 SERPL-SCNC: 29 MMOL/L (ref 21–32)
COLOR UR: YELLOW
CREAT BLD-MCNC: 0.87 MG/DL
DEPRECATED RDW RBC AUTO: 45.1 FL (ref 35.1–46.3)
EOSINOPHIL # BLD AUTO: 0.33 X10(3) UL (ref 0–0.7)
EOSINOPHIL NFR BLD AUTO: 5.1 %
ERYTHROCYTE [DISTWIDTH] IN BLOOD BY AUTOMATED COUNT: 14 % (ref 11–15)
FASTING PATIENT LIPID ANSWER: YES
FASTING STATUS PATIENT QL REPORTED: YES
GFR SERPLBLD BASED ON 1.73 SQ M-ARVRAT: 69 ML/MIN/1.73M2 (ref 60–?)
GLOBULIN PLAS-MCNC: 3.3 G/DL (ref 2.8–4.4)
GLUCOSE BLD-MCNC: 97 MG/DL (ref 70–99)
GLUCOSE UR-MCNC: NORMAL MG/DL
HCT VFR BLD AUTO: 41.9 %
HDLC SERPL-MCNC: 56 MG/DL (ref 40–59)
HGB BLD-MCNC: 13.2 G/DL
HGB UR QL STRIP.AUTO: NEGATIVE
IMM GRANULOCYTES # BLD AUTO: 0.01 X10(3) UL (ref 0–1)
IMM GRANULOCYTES NFR BLD: 0.2 %
KETONES UR-MCNC: NEGATIVE MG/DL
LDLC SERPL CALC-MCNC: 70 MG/DL (ref ?–100)
LEUKOCYTE ESTERASE UR QL STRIP.AUTO: 500
LYMPHOCYTES # BLD AUTO: 1.5 X10(3) UL (ref 1–4)
LYMPHOCYTES NFR BLD AUTO: 23.3 %
MCH RBC QN AUTO: 27.7 PG (ref 26–34)
MCHC RBC AUTO-ENTMCNC: 31.5 G/DL (ref 31–37)
MCV RBC AUTO: 88 FL
MONOCYTES # BLD AUTO: 0.42 X10(3) UL (ref 0.1–1)
MONOCYTES NFR BLD AUTO: 6.5 %
NEUTROPHILS # BLD AUTO: 4.13 X10 (3) UL (ref 1.5–7.7)
NEUTROPHILS # BLD AUTO: 4.13 X10(3) UL (ref 1.5–7.7)
NEUTROPHILS NFR BLD AUTO: 64.1 %
NITRITE UR QL STRIP.AUTO: NEGATIVE
NONHDLC SERPL-MCNC: 85 MG/DL (ref ?–130)
OSMOLALITY SERPL CALC.SUM OF ELEC: 298 MOSM/KG (ref 275–295)
PH UR: 5.5 [PH] (ref 5–8)
PLATELET # BLD AUTO: 271 10(3)UL (ref 150–450)
POTASSIUM SERPL-SCNC: 4 MMOL/L (ref 3.5–5.1)
PROT SERPL-MCNC: 7 G/DL (ref 6.4–8.2)
PROT UR-MCNC: 20 MG/DL
RBC # BLD AUTO: 4.76 X10(6)UL
SODIUM SERPL-SCNC: 142 MMOL/L (ref 136–145)
SP GR UR STRIP: 1.03 (ref 1–1.03)
T3FREE SERPL-MCNC: 3.09 PG/ML (ref 2.4–4.2)
T4 FREE SERPL-MCNC: 1.2 NG/DL (ref 0.8–1.7)
TRIGL SERPL-MCNC: 75 MG/DL (ref 30–149)
TSI SER-ACNC: 0.76 MIU/ML (ref 0.36–3.74)
UROBILINOGEN UR STRIP-ACNC: NORMAL
VLDLC SERPL CALC-MCNC: 11 MG/DL (ref 0–30)
WBC # BLD AUTO: 6.4 X10(3) UL (ref 4–11)

## 2023-05-06 PROCEDURE — 36415 COLL VENOUS BLD VENIPUNCTURE: CPT

## 2023-05-06 PROCEDURE — 84443 ASSAY THYROID STIM HORMONE: CPT

## 2023-05-06 PROCEDURE — 80053 COMPREHEN METABOLIC PANEL: CPT

## 2023-05-06 PROCEDURE — 81001 URINALYSIS AUTO W/SCOPE: CPT

## 2023-05-06 PROCEDURE — 80061 LIPID PANEL: CPT

## 2023-05-06 PROCEDURE — 84439 ASSAY OF FREE THYROXINE: CPT

## 2023-05-06 PROCEDURE — 84481 FREE ASSAY (FT-3): CPT

## 2023-05-06 PROCEDURE — 85025 COMPLETE CBC W/AUTO DIFF WBC: CPT

## 2023-05-09 ENCOUNTER — OFFICE VISIT (OUTPATIENT)
Dept: INTERNAL MEDICINE CLINIC | Facility: CLINIC | Age: 78
End: 2023-05-09

## 2023-05-09 VITALS
OXYGEN SATURATION: 99 % | WEIGHT: 170 LBS | HEART RATE: 73 BPM | RESPIRATION RATE: 17 BRPM | HEIGHT: 60 IN | SYSTOLIC BLOOD PRESSURE: 122 MMHG | DIASTOLIC BLOOD PRESSURE: 74 MMHG | TEMPERATURE: 98 F | BODY MASS INDEX: 33.38 KG/M2

## 2023-05-09 DIAGNOSIS — Z00.00 MEDICARE ANNUAL WELLNESS VISIT, SUBSEQUENT: Primary | ICD-10-CM

## 2023-05-09 DIAGNOSIS — J44.9 ASTHMA WITH COPD (CHRONIC OBSTRUCTIVE PULMONARY DISEASE) (HCC): ICD-10-CM

## 2023-05-09 DIAGNOSIS — M17.0 PRIMARY OSTEOARTHRITIS OF BOTH KNEES: ICD-10-CM

## 2023-05-09 DIAGNOSIS — Z00.00 ENCOUNTER FOR ANNUAL HEALTH EXAMINATION: ICD-10-CM

## 2023-05-09 DIAGNOSIS — H25.13 AGE-RELATED NUCLEAR CATARACT OF BOTH EYES: ICD-10-CM

## 2023-05-09 DIAGNOSIS — N60.41 MAMMARY DUCT ECTASIA OF RIGHT BREAST: ICD-10-CM

## 2023-05-09 DIAGNOSIS — Z86.73 HISTORY OF TIA (TRANSIENT ISCHEMIC ATTACK): ICD-10-CM

## 2023-05-09 DIAGNOSIS — I77.1 TORTUOUS AORTA (HCC): ICD-10-CM

## 2023-05-09 DIAGNOSIS — K21.9 GASTROESOPHAGEAL REFLUX DISEASE, UNSPECIFIED WHETHER ESOPHAGITIS PRESENT: ICD-10-CM

## 2023-05-09 DIAGNOSIS — R41.89 BRAIN FOG: ICD-10-CM

## 2023-05-09 DIAGNOSIS — E55.9 VITAMIN D DEFICIENCY: ICD-10-CM

## 2023-05-09 DIAGNOSIS — K11.7 DROOLING: ICD-10-CM

## 2023-05-09 DIAGNOSIS — E05.90 SUBCLINICAL HYPERTHYROIDISM: ICD-10-CM

## 2023-05-09 DIAGNOSIS — M85.80 OSTEOPENIA, UNSPECIFIED LOCATION: ICD-10-CM

## 2023-05-09 DIAGNOSIS — E05.90 HYPERTHYROIDISM: ICD-10-CM

## 2023-05-09 DIAGNOSIS — R01.1 HEART MURMUR: ICD-10-CM

## 2023-05-09 DIAGNOSIS — I10 ESSENTIAL HYPERTENSION: ICD-10-CM

## 2023-05-09 DIAGNOSIS — E04.1 THYROID NODULE: ICD-10-CM

## 2023-05-09 DIAGNOSIS — F41.1 GENERALIZED ANXIETY DISORDER: ICD-10-CM

## 2023-05-09 DIAGNOSIS — E78.5 HYPERLIPIDEMIA, UNSPECIFIED HYPERLIPIDEMIA TYPE: ICD-10-CM

## 2023-05-13 PROBLEM — Z78.0 POSTMENOPAUSAL: Status: RESOLVED | Noted: 2019-05-14 | Resolved: 2023-05-13

## 2023-05-13 PROBLEM — E66.9 CLASS 2 OBESITY IN ADULT: Status: RESOLVED | Noted: 2022-06-16 | Resolved: 2023-05-13

## 2023-05-13 PROBLEM — E66.812 CLASS 2 OBESITY IN ADULT: Status: RESOLVED | Noted: 2022-06-16 | Resolved: 2023-05-13

## 2023-06-13 RX ORDER — ATORVASTATIN CALCIUM 20 MG/1
20 TABLET, FILM COATED ORAL NIGHTLY
Qty: 90 TABLET | Refills: 3 | Status: SHIPPED | OUTPATIENT
Start: 2023-06-13

## 2023-06-13 NOTE — TELEPHONE ENCOUNTER
Refill passed per [facility] protocol.     Requested Prescriptions   Pending Prescriptions Disp Refills    ATORVASTATIN 20 MG Oral Tab [Pharmacy Med Name: ATORVASTATIN 20MG TABLETS] 90 tablet 1     Sig: TAKE 1 TABLET(20 MG) BY MOUTH EVERY NIGHT       Cholesterol Medication Protocol Passed - 6/13/2023  7:02 AM        Passed - ALT in past 12 months        Passed - LDL in past 12 months        Passed - Last ALT < 80     Lab Results   Component Value Date    ALT 29 05/06/2023             Passed - Last LDL < 130     Lab Results   Component Value Date    LDL 70 05/06/2023               Passed - In person appointment or virtual visit in the past 12 mos or appointment in next 3 mos     Recent Outpatient Visits              1 month ago Medicare annual wellness visit, subsequent    [address] [place] [address] Denisse, Dann Rajput MD    Office Visit    1 month ago History of TIA (transient ischemic attack)    [address] [place] [address] Dann Salcedo MD    Office Visit    1 year ago Encounter for annual health examination    [address] [place] [address] Denisse, Dina Landa MD    Office Visit    1 year ago COVID-19 virus infection    [address] [place] [address] Denisse, Dann Rajput MD    [facility]    1 year ago Heart murmur    [address] [place] [address] Denisse, Dann Rajput MD    Office Visit          Future Appointments         Provider Department Appt Notes    In 5 months Brigid Pimentel MD [address] [facility],[address], [address], [place] 6 month fu                  Future Appointments         Provider Department Appt Notes    In 5 months Brigid Pimentel MD [address] [place], [place] [address]j[place], [place] 6 month fu          Recent Outpatient Visits              1 month ago Atul Otto annual wellness visit, subsequent    [address] [place] [address] Denisse, Dann Rajput MD    Office Visit    1 month ago History of T[facility] (transient ischemic attack)    [address] [facility]    Office Visit    1 year ago Encounter for annual health examination    Cresencio Alfaro MD    Office Visit    1 year ago COVID-19 virus infection    [address] [facility]    [facility]    1 year ago Heart murmur    [address] Denisse, Florecita Mendiola MD    Office Visit

## 2023-07-28 ENCOUNTER — TELEPHONE (OUTPATIENT)
Dept: INTERNAL MEDICINE CLINIC | Facility: CLINIC | Age: 78
End: 2023-07-28

## 2023-07-28 DIAGNOSIS — Z12.31 ENCOUNTER FOR SCREENING MAMMOGRAM FOR MALIGNANT NEOPLASM OF BREAST: Primary | ICD-10-CM

## 2023-07-28 NOTE — TELEPHONE ENCOUNTER
RECOMMENDATIONS:   ROUTINE MAMMOGRAM AND CLINICAL EVALUATION IN 12 MONTHS. Order placed , attempted to notify pt, call rang & then went to busy dial tone.      If call back please provide central scheduling phone number

## 2023-08-17 ENCOUNTER — HOSPITAL ENCOUNTER (OUTPATIENT)
Dept: MAMMOGRAPHY | Age: 78
Discharge: HOME | End: 2023-08-17
Attending: INTERNAL MEDICINE
Payer: MEDICARE

## 2023-08-17 DIAGNOSIS — Z12.31 ENCOUNTER FOR SCREENING MAMMOGRAM FOR MALIGNANT NEOPLASM OF BREAST: ICD-10-CM

## 2023-08-17 PROCEDURE — 77067 SCR MAMMO BI INCL CAD: CPT | Performed by: INTERNAL MEDICINE

## 2023-08-17 PROCEDURE — 77063 BREAST TOMOSYNTHESIS BI: CPT | Performed by: INTERNAL MEDICINE

## 2023-10-16 ENCOUNTER — OFFICE VISIT (OUTPATIENT)
Dept: NEUROLOGY | Facility: CLINIC | Age: 78
End: 2023-10-16
Payer: MEDICARE

## 2023-10-16 VITALS — HEIGHT: 60 IN | WEIGHT: 170 LBS | BODY MASS INDEX: 33.38 KG/M2

## 2023-10-16 DIAGNOSIS — R55 SYNCOPE, UNSPECIFIED SYNCOPE TYPE: Primary | ICD-10-CM

## 2023-10-16 PROCEDURE — 99204 OFFICE O/P NEW MOD 45 MIN: CPT | Performed by: OTHER

## 2023-10-16 PROCEDURE — 3008F BODY MASS INDEX DOCD: CPT | Performed by: OTHER

## 2023-10-16 PROCEDURE — 1159F MED LIST DOCD IN RCRD: CPT | Performed by: OTHER

## 2023-10-16 PROCEDURE — 1160F RVW MEDS BY RX/DR IN RCRD: CPT | Performed by: OTHER

## 2023-10-16 RX ORDER — METOPROLOL SUCCINATE 50 MG/1
50 TABLET, EXTENDED RELEASE ORAL DAILY
COMMUNITY

## 2023-10-16 RX ORDER — MULTIVITAMIN WITH IRON
250 TABLET ORAL DAILY
COMMUNITY

## 2023-10-16 RX ORDER — ERGOCALCIFEROL (VITAMIN D2) 10 MCG
400 TABLET ORAL DAILY
COMMUNITY

## 2023-10-16 NOTE — PROGRESS NOTES
Neurology Initial Visit     Referred By: Dr. Lucas ref. provider found    Chief Complaint: Patient presents with:  Neurologic Problem: NPT - Pt states that she has a hx of TIA (2008). States that in May 2022, she had several episodes of where she blacked out/zoned out. States it happened several times in one day. Since then, she has not had any other episodes. HPI:     Florentin Tovar is a 70year old female, who presents for few episodes of zoning out. Patient may have 2023 had episode when she was driving home from a conference history of organized. She might not have gotten good sleep prior night. She might be somewhat stressed. She was driving home with a friend. When she was starting to do mild, her friend would call her name when she would wake up. Her car was started to veer to the side. She would correct herself and keep driving. She had to walk out of the car a few times. She denied specific sleepiness or lethargy. There was no lightheadedness. It has not happened since that day in May. No history of seizures. No history of any neurological disease. She does have history of atrial fibrillation and some adjustments to medications were made at that time. .     Past Medical History:   Diagnosis Date    Appendicitis     Asthma     Cholelithiasis     Extrinsic asthma, unspecified     Hiatal hernia     History of shingles 4/2/2019    Osteoarthrosis, unspecified whether generalized or localized, unspecified site     Osteoporosis, senile 7/14/2017    Postmenopausal bleeding     Torn meniscus     Unspecified essential hypertension     Visual impairment     glasses       Past Surgical History:   Procedure Laterality Date    APPENDECTOMY      BREAST BIOPSY Right     CHOLECYSTECTOMY      COLONOSCOPY  05/2003    [facility]    D & C  2008    D&C AFTER DELIVERY      EGD  05/2003    [facility]    EGD  09/2008    hiatal hernia    EXCISIONAL BIOSPY RIGHT Right 8/29/19    [facility] breast terminal duct excision and correction of nipple inversion    KNEE ARTHROSCOPY Right     LAPAROSCOPIC INIT HERNIA REPAIR  2009    paraesophageal    LÁZARO LOCALIZATION WIRE 1 SITE LEFT (CPT=19281)  2007    LÁZARO LOCALIZATION WIRE 1 SITE RIGHT (LCM=90578)  2001    NEEDLE BIOPSY RIGHT  08/29/2019    Surgery for inverted nipple Dr. Peterson Valero      excision of left breast plate    OTHER Right 8/29/19    Right terminal duct excision with correction of nipple inversion    TUBAL LIGATION         Social history:    Smoking status:   Former      Types:   Cigarettes      Smokeless tobacco:   Never      Comment:   occasional cigarette with friends in highschool          Alcohol use   Yes   0.0 standard drinks of alcohol/week      Comment:   occasional wine          Drug use:   No       Family History   Adopted: Yes   Problem Relation Age of Onset    Cancer Brother         Lung    Dementia Other     Breast Cancer Neg     Ovarian Cancer Neg          Current Outpatient Medications:     magnesium 250 MG Oral Tab, Take 1 tablet (250 mg total) by mouth daily. , Disp: , Rfl:     metoprolol succinate ER 50 MG Oral Tablet 24 Hr, Take 1 tablet (50 mg total) by mouth daily. , Disp: , Rfl:     cholecalciferol (VITAMIN D3) 10 MCG (400 UNIT) Oral Tab, Take 1 tablet (400 Units total) by mouth daily. , Disp: , Rfl:     atorvastatin 20 MG Oral Tab, Take 1 tablet (20 mg total) by mouth nightly., Disp: 90 tablet, Rfl: 3    methimazole 5 MG Oral Tab, Take 0.5 tablets (2.5 mg total) by mouth 3 (three) times a week., Disp: , Rfl:     lisinopril 5 MG Oral Tab, Take 1 tablet (5 mg total) by mouth daily. , Disp: 90 tablet, Rfl: 1      Penicillins             UNKNOWN    Comment:Unsure, from childhood  Wasps                   OTHER (SEE COMMENTS)    Comment:Causes welts  Latex                   RASH, ITCHING    Comment:Latex tape causes rash    ROS:   As in HPI, the rest of the 14 system review was done and was negative      Physical Exam:   10/16/23  1304   Weight: 170 lb (77.1 kg) Height: 60\"       General: No apparent distress, well nourished, well groomed. Head- Normocephalic, atraumatic  Eyes- No redness or swelling  ENT- Hearing intake, normal glutition  Neck- No masses or adenopathy  Cv: pulses were palpable and normal, no cyanosis or edema     Neurological:     Mental Status- Alert and oriented x3. Normal attention span and concentration  Thought process intact  Memory intact- recent and remote  Mood intact  Fund of knowledge appropriate for education and age    Language intact including: comprehension, naming, repetition, vocabulary    Cranial Nerves:    VII. Face symmetric, no facial weakness  VIII. Hearing intact to whisper. IX. Pallet elevates symmetrically. XI. Shoulder shrug is intact  XII. Tongue is midline    Motor Exam:  Muscle tone normal  No atrophy or fasciculations  Strength- upper extremities 5/5 proximally and distally                   Rapid alternating movements intact    Gait:  Normal posture  Normal physiologic      Labs:    Lab Results   Component Value Date    TSH 0.756 05/06/2023     Lab Results   Component Value Date    HDL 56 05/06/2023    LDL 70 05/06/2023    TRIG 75 05/06/2023     Lab Results   Component Value Date    HGB 13.2 05/06/2023    HCT 41.9 05/06/2023    MCV 88.0 05/06/2023    WBC 6.4 05/06/2023    .0 05/06/2023      Lab Results   Component Value Date    BUN 24 (H) 05/06/2023    CA 9.7 05/06/2023    ALT 29 05/06/2023    AST 22 05/06/2023    ALKPHOS 89 09/22/2011    ALB 3.7 05/06/2023     05/06/2023    K 4.0 05/06/2023     05/06/2023    CO2 29.0 05/06/2023      I have reviewed labs. Assessment   1. Syncope, unspecified syncope type  Unclear etiology for syncopal events. Possible sleep attacks. Possibly due to stress and lack of sleep prior to that. However will need to rule out any other etiology including epileptic events and therefore MRI of the brain and EEG also will be done.   Patient was instructed if she ever has syncope while driving she has to stop the car and have somebody else drive her home. - EEG  - MRI BRAIN (W+WO) (CPT=70553); Future  - CARD ECHO 2D W DOPPLER/BUBBLES (CPT=93306); Future  - CARD MONITOR HOLTER 48 HOUR (CPT=93225); Future           Education and counseling provided to patient. Instructed patient to call my office or seek medical attention immediately if symptoms worsen. Patient verbalized understanding of information given. All questions were answered. All side effects of drugs were discussed. Return to clinic in: Return if symptoms worsen or fail to improve.     Bhanu Reveles MD

## 2023-12-01 ENCOUNTER — OFFICE VISIT (OUTPATIENT)
Dept: INTERNAL MEDICINE CLINIC | Facility: CLINIC | Age: 78
End: 2023-12-01

## 2023-12-01 VITALS
HEART RATE: 78 BPM | DIASTOLIC BLOOD PRESSURE: 72 MMHG | TEMPERATURE: 98 F | OXYGEN SATURATION: 99 % | WEIGHT: 165 LBS | BODY MASS INDEX: 32.39 KG/M2 | SYSTOLIC BLOOD PRESSURE: 120 MMHG | RESPIRATION RATE: 18 BRPM | HEIGHT: 60 IN

## 2023-12-01 DIAGNOSIS — Z86.73 HISTORY OF TIA (TRANSIENT ISCHEMIC ATTACK): Primary | ICD-10-CM

## 2023-12-01 DIAGNOSIS — I10 ESSENTIAL HYPERTENSION: ICD-10-CM

## 2023-12-01 PROCEDURE — 1159F MED LIST DOCD IN RCRD: CPT | Performed by: INTERNAL MEDICINE

## 2023-12-01 PROCEDURE — 3008F BODY MASS INDEX DOCD: CPT | Performed by: INTERNAL MEDICINE

## 2023-12-01 PROCEDURE — 3078F DIAST BP <80 MM HG: CPT | Performed by: INTERNAL MEDICINE

## 2023-12-01 PROCEDURE — 3074F SYST BP LT 130 MM HG: CPT | Performed by: INTERNAL MEDICINE

## 2023-12-01 PROCEDURE — 1126F AMNT PAIN NOTED NONE PRSNT: CPT | Performed by: INTERNAL MEDICINE

## 2023-12-01 PROCEDURE — 1160F RVW MEDS BY RX/DR IN RCRD: CPT | Performed by: INTERNAL MEDICINE

## 2023-12-01 PROCEDURE — 99213 OFFICE O/P EST LOW 20 MIN: CPT | Performed by: INTERNAL MEDICINE

## 2023-12-01 NOTE — PROGRESS NOTES
Subjective:     Patient ID: Michelle Muller is a 70year old female. HPI  Patient comes in for follow-up  She was seen she had a presyncopal episode patient had seen neurology when ordered some test but patient says that she never did it because it did not explain it to her as to why she has to do it. Will read the note from neurology and the reason behind that patient agrees to do . Since then she has not had similar episodes otherwise she is doing okay    History/Other:   Review of Systems   Constitutional: Negative. HENT: Negative. Eyes: Negative. Respiratory: Negative. Cardiovascular: Negative. Gastrointestinal: Negative. Genitourinary: Negative. Musculoskeletal: Negative. Skin: Negative. Neurological: Negative. Psychiatric/Behavioral: Negative. Current Outpatient Medications   Medication Sig Dispense Refill    magnesium 250 MG Oral Tab Take 1 tablet (250 mg total) by mouth daily. metoprolol succinate ER 50 MG Oral Tablet 24 Hr Take 1 tablet (50 mg total) by mouth daily. cholecalciferol (VITAMIN D3) 10 MCG (400 UNIT) Oral Tab Take 1 tablet (400 Units total) by mouth daily. atorvastatin 20 MG Oral Tab Take 1 tablet (20 mg total) by mouth nightly. 90 tablet 3    methimazole 5 MG Oral Tab Take 0.5 tablets (2.5 mg total) by mouth 3 (three) times a week. lisinopril 5 MG Oral Tab Take 1 tablet (5 mg total) by mouth daily. 90 tablet 1     Allergies:   Allergies   Allergen Reactions    Penicillins UNKNOWN     Unsure, from childhood    Wasps OTHER (SEE COMMENTS)     Causes welts    Latex RASH and ITCHING     Latex tape causes rash       Past Medical History:   Diagnosis Date    Appendicitis     Asthma     Cholelithiasis     Extrinsic asthma, unspecified     Hiatal hernia     History of shingles 4/2/2019    Osteoarthrosis, unspecified whether generalized or localized, unspecified site     Osteoporosis, senile 7/14/2017    Postmenopausal bleeding     Torn meniscus     Unspecified essential hypertension     Visual impairment     glasses      Past Surgical History:   Procedure Laterality Date    APPENDECTOMY      BREAST BIOPSY Right     CHOLECYSTECTOMY      COLONOSCOPY  05/2003    [facility]    D & C  2008    D&C AFTER DELIVERY      EGD  05/2003    [facility]    EGD  09/2008    hiatal hernia    EXCISIONAL BIOSPY RIGHT Right 8/29/19    [facility] breast terminal duct excision and correction of nipple inversion    KNEE ARTHROSCOPY Right     LAPAROSCOPIC INIT HERNIA REPAIR  2009    paraesophageal    LÁZARO LOCALIZATION WIRE 1 SITE LEFT (CPT=19281)  2007    LÁZARO LOCALIZATION WIRE 1 SITE RIGHT (CPT=19281)  2001    NEEDLE BIOPSY RIGHT  08/29/2019    Surgery for inverted nipple Dr. Bladimir Schreiber      excision of left breast plate    OTHER Right 8/29/19    Right terminal duct excision with correction of nipple inversion    TUBAL LIGATION        Family History   Adopted: Yes   Problem Relation Age of Onset    Cancer Brother         Lung    Dementia Other     Breast Cancer Neg     Ovarian Cancer Neg       Social History:   Social History     Socioeconomic History    Marital status:     Number of children: 3   Occupational History     Comment: Retired, worked in health care industry   Tobacco Use    Smoking status: Former     Types: Cigarettes    Smokeless tobacco: Never    Tobacco comments:     occasional cigarette with friends in highschool   Substance and Sexual Activity    Alcohol use: Yes     Alcohol/week: 0.0 standard drinks of alcohol     Comment: occasional wine    Drug use: No   Other Topics Concern    Pt has a pacemaker No    Pt has a defibrillator No    Reaction to local anesthetic No    Caffeine Concern Yes     Comment: Coffee, soda         Objective:   Physical Exam  Vitals and nursing note reviewed. Constitutional:       Appearance: She is well-developed. HENT:      Head: Normocephalic and atraumatic.       Right Ear: External ear normal.      Left Ear: External ear normal. Nose: Nose normal.   Eyes:      Conjunctiva/sclera: Conjunctivae normal.      Pupils: Pupils are equal, round, and reactive to light. Cardiovascular:      Rate and Rhythm: Normal rate and regular rhythm. Heart sounds: Normal heart sounds. Pulmonary:      Effort: Pulmonary effort is normal.      Breath sounds: Normal breath sounds. Abdominal:      General: Bowel sounds are normal.      Palpations: Abdomen is soft. Genitourinary:     Vagina: Normal.   Musculoskeletal:         General: Normal range of motion. Cervical back: Normal range of motion and neck supple. Skin:     General: Skin is warm and dry. Neurological:      Mental Status: She is alert and oriented to person, place, and time. Deep Tendon Reflexes: Reflexes are normal and symmetric. Psychiatric:         Behavior: Behavior normal.         Thought Content: Thought content normal.         Judgment: Judgment normal.         Assessment & Plan:   1. History of TIA (transient ischemic attack) stable medical management follow-up with neuro   2. Essential hypertension well-controlled continue current treatment       No orders of the defined types were placed in this encounter.       Meds This Visit:  Requested Prescriptions      No prescriptions requested or ordered in this encounter       Imaging & Referrals:  None

## 2023-12-15 ENCOUNTER — HOSPITAL ENCOUNTER (OUTPATIENT)
Dept: ELECTROPHYSIOLOGY | Facility: HOSPITAL | Age: 78
Discharge: HOME | End: 2023-12-15
Attending: Other
Payer: MEDICARE

## 2023-12-15 ENCOUNTER — TELEPHONE (OUTPATIENT)
Dept: NEUROLOGY | Facility: CLINIC | Age: 78
End: 2023-12-15

## 2023-12-15 PROBLEM — R55 SYNCOPE: Status: ACTIVE | Noted: 2023-12-15

## 2023-12-15 PROCEDURE — 95816 EEG AWAKE AND DROWSY: CPT | Performed by: OTHER

## 2023-12-15 NOTE — TELEPHONE ENCOUNTER
Phone call received back from pt. Advised pt that her EEG study results came back normal. Pt verbalized understanding.

## 2023-12-15 NOTE — PROCEDURES
EEG report    REFERRING PHYSICIAN: Elsa Tapia MD    PCP and phone number:  Reyna Powell MD  [phone]    TECHNIQUE: 21 channels of EEG, 2 channels of EOG, and 1 channel of EKG were recorded utilizing the International 10/20 System. The recording was performed in a digitized monopolar referential format and playback was reformatted into various referential and bipolar montages utilizing appropriate filter settings. Automatic seizure and spike detection programs were utilized throughout the recording. Video was recorded during the study    CLINICAL DATA:  Patient is sent for the evaluation of possible seizures. MEDICATION:  Continuous Medications:      Scheduled Medications:    Current Outpatient Medications:     magnesium 250 MG Oral Tab, Take 1 tablet (250 mg total) by mouth daily. , Disp: , Rfl:     metoprolol succinate ER 50 MG Oral Tablet 24 Hr, Take 1 tablet (50 mg total) by mouth daily. , Disp: , Rfl:     cholecalciferol (VITAMIN D3) 10 MCG (400 UNIT) Oral Tab, Take 1 tablet (400 Units total) by mouth daily. , Disp: , Rfl:     atorvastatin 20 MG Oral Tab, Take 1 tablet (20 mg total) by mouth nightly., Disp: 90 tablet, Rfl: 3    methimazole 5 MG Oral Tab, Take 0.5 tablets (2.5 mg total) by mouth 3 (three) times a week., Disp: , Rfl:     lisinopril 5 MG Oral Tab, Take 1 tablet (5 mg total) by mouth daily. , Disp: 90 tablet, Rfl: 1    PRN Medications:      ACTIVATION:  Hyperventilation: Not done  Photic stimulation: Done, no abnormalities  Sleep: Normal sleep architecture was seen. BACKGROUND  While the patient was awake, the posterior dominant rhythm consisted of well-regulated 9-10 Hz rhythmic waveforms, symmetrically distributed over both posterior quadrants and was reactive to eye opening. EEG ABNORMALITY  None    IMPRESSION:  This is a normal EEG. No focal, lateralized, or epileptiform features are noted. Clinical correlation required.

## 2023-12-15 NOTE — TELEPHONE ENCOUNTER
----- Message from Rosendo Eduardo MD sent at 12/15/2023  8:55 AM CST -----  Please let patient know that EEG was normal.

## 2024-01-29 ENCOUNTER — HOSPITAL ENCOUNTER (OUTPATIENT)
Dept: MRI IMAGING | Age: 79
Discharge: HOME | End: 2024-01-29
Attending: Other
Payer: MEDICARE

## 2024-01-29 DIAGNOSIS — R55 SYNCOPE, UNSPECIFIED SYNCOPE TYPE: ICD-10-CM

## 2024-01-29 PROCEDURE — A9575 INJ GADOTERATE MEGLUMI 0.1ML: HCPCS | Performed by: OTHER

## 2024-01-29 PROCEDURE — 70553 MRI BRAIN STEM W/O & W/DYE: CPT | Performed by: OTHER

## 2024-01-29 RX ORDER — GADOTERATE MEGLUMINE 376.9 MG/ML
15 INJECTION INTRAVENOUS
Status: COMPLETED | OUTPATIENT
Start: 2024-01-29 | End: 2024-01-29

## 2024-01-29 RX ADMIN — GADOTERATE MEGLUMINE 15 ML: 376.9 INJECTION INTRAVENOUS at 12:06:00

## 2024-01-30 ENCOUNTER — TELEPHONE (OUTPATIENT)
Dept: NEUROLOGY | Facility: CLINIC | Age: 79
End: 2024-01-30

## 2024-01-30 NOTE — TELEPHONE ENCOUNTER
Patient returned call. Notified of normal result. Pt verbalized understanding & appreciative of call

## 2024-01-30 NOTE — TELEPHONE ENCOUNTER
----- Message from Juice Wood MD sent at 1/30/2024  6:49 AM CST -----  Please let patient know that MRI brain didn't show significant abnormalities.

## 2024-06-07 ENCOUNTER — OFFICE VISIT (OUTPATIENT)
Dept: INTERNAL MEDICINE CLINIC | Facility: CLINIC | Age: 79
End: 2024-06-07

## 2024-06-07 VITALS
TEMPERATURE: 98 F | OXYGEN SATURATION: 98 % | HEART RATE: 67 BPM | WEIGHT: 164 LBS | SYSTOLIC BLOOD PRESSURE: 118 MMHG | RESPIRATION RATE: 17 BRPM | HEIGHT: 60 IN | DIASTOLIC BLOOD PRESSURE: 80 MMHG | BODY MASS INDEX: 32.2 KG/M2

## 2024-06-07 DIAGNOSIS — E55.9 VITAMIN D DEFICIENCY: ICD-10-CM

## 2024-06-07 DIAGNOSIS — I10 ESSENTIAL HYPERTENSION: ICD-10-CM

## 2024-06-07 DIAGNOSIS — E04.1 THYROID NODULE: ICD-10-CM

## 2024-06-07 DIAGNOSIS — Z86.73 HISTORY OF TIA (TRANSIENT ISCHEMIC ATTACK): ICD-10-CM

## 2024-06-07 DIAGNOSIS — M85.80 OSTEOPENIA, UNSPECIFIED LOCATION: ICD-10-CM

## 2024-06-07 DIAGNOSIS — N60.41 MAMMARY DUCT ECTASIA OF RIGHT BREAST: ICD-10-CM

## 2024-06-07 DIAGNOSIS — R13.10 DYSPHAGIA, UNSPECIFIED TYPE: ICD-10-CM

## 2024-06-07 DIAGNOSIS — E05.90 HYPERTHYROIDISM: ICD-10-CM

## 2024-06-07 DIAGNOSIS — Z00.00 MEDICARE ANNUAL WELLNESS VISIT, SUBSEQUENT: Primary | ICD-10-CM

## 2024-06-07 DIAGNOSIS — I77.1 TORTUOUS AORTA (HCC): ICD-10-CM

## 2024-06-07 DIAGNOSIS — E78.5 HYPERLIPIDEMIA, UNSPECIFIED HYPERLIPIDEMIA TYPE: ICD-10-CM

## 2024-06-07 DIAGNOSIS — K21.9 GASTROESOPHAGEAL REFLUX DISEASE, UNSPECIFIED WHETHER ESOPHAGITIS PRESENT: ICD-10-CM

## 2024-06-07 DIAGNOSIS — M17.0 PRIMARY OSTEOARTHRITIS OF BOTH KNEES: ICD-10-CM

## 2024-06-07 DIAGNOSIS — H25.13 AGE-RELATED NUCLEAR CATARACT OF BOTH EYES: ICD-10-CM

## 2024-06-07 DIAGNOSIS — Z00.00 ENCOUNTER FOR ANNUAL HEALTH EXAMINATION: ICD-10-CM

## 2024-06-07 DIAGNOSIS — Z00.00 ENCOUNTER FOR PREVENTIVE CARE: ICD-10-CM

## 2024-06-07 DIAGNOSIS — F41.1 GENERALIZED ANXIETY DISORDER: ICD-10-CM

## 2024-06-07 PROCEDURE — G0439 PPPS, SUBSEQ VISIT: HCPCS | Performed by: INTERNAL MEDICINE

## 2024-06-07 PROCEDURE — 96160 PT-FOCUSED HLTH RISK ASSMT: CPT | Performed by: INTERNAL MEDICINE

## 2024-06-07 PROCEDURE — 3008F BODY MASS INDEX DOCD: CPT | Performed by: INTERNAL MEDICINE

## 2024-06-07 PROCEDURE — 99499 UNLISTED E&M SERVICE: CPT | Performed by: INTERNAL MEDICINE

## 2024-06-07 PROCEDURE — 3074F SYST BP LT 130 MM HG: CPT | Performed by: INTERNAL MEDICINE

## 2024-06-07 PROCEDURE — 3079F DIAST BP 80-89 MM HG: CPT | Performed by: INTERNAL MEDICINE

## 2024-06-07 NOTE — PROGRESS NOTES
Subjective:   Akua Carroll is a 78 year old female who presents for a Medicare Subsequent Annual Wellness visit (Pt already had Initial Annual Wellness) and .     Patient comes in today for Medicare annual only complaint she has is that lately she is feeling like some food gets stuck in her throat and chest has been going on for more than a few months no weight loss no pain no other complaints  History/Other:   Fall Risk Assessment:   She has been screened for Falls and is low risk.      Cognitive Assessment:   She had a completely normal cognitive assessment - see flowsheet entries     Functional Ability/Status:   Akua Carroll has a completely normal functional assessment. See flowsheet for details.      Depression Screening (PHQ-2/PHQ-9): PHQ-2 SCORE: 0  , done 6/7/2024   If you checked off any problems, how difficult have these problems made it for you to do your work, take care of things at home, or get along with other people?: Not difficult at all    Last [place] Suicide Screening on 6/7/2024 was No Risk.          Advanced Directives:   She does NOT have a Living Will. [Do you have a living will?: No]  She does NOT have a Power of  for Health Care. [Do you have a healthcare power of ?: No]  Discussed Advance Care Planning with patient (and family/surrogate if present). Standard forms made available to patient in After Visit Summary.      Patient Active Problem List   Diagnosis    Esophageal reflux    Osteoarthritis of knee    Vitamin D deficiency    Tortuous aorta (HCC)    Osteopenia    Wasp sting allergy    Age-related nuclear cataract of both eyes    Essential hypertension    Mammary duct ectasia of right breast    Thyroid nodule     Allergies:  She is allergic to penicillins, wasps, and latex.    Current Medications:  Outpatient Medications Marked as Taking for the 6/7/24 encounter (Office Visit) with Thompson Ovalle MD   Medication Sig    magnesium 250 MG Oral Tab Take 1 tablet  (250 mg total) by mouth daily.    metoprolol succinate ER 50 MG Oral Tablet 24 Hr Take 1 tablet (50 mg total) by mouth daily.    cholecalciferol (VITAMIN D3) 10 MCG (400 UNIT) Oral Tab Take 1 tablet (400 Units total) by mouth daily.    atorvastatin 20 MG Oral Tab Take 1 tablet (20 mg total) by mouth nightly.    methimazole 5 MG Oral Tab Take 0.5 tablets (2.5 mg total) by mouth 3 (three) times a week.    lisinopril 5 MG Oral Tab Take 1 tablet (5 mg total) by mouth daily.       Medical History:  She  has a past medical history of Appendicitis, Asthma (HCC), Cholelithiasis, Extrinsic asthma, unspecified, Hiatal hernia, History of shingles (4/2/2019), Osteoarthrosis, unspecified whether generalized or localized, unspecified site, Osteoporosis, senile (7/14/2017), Postmenopausal bleeding, Torn meniscus, Unspecified essential hypertension, and Visual impairment.  Surgical History:  She  has a past surgical history that includes cholecystectomy; d&c after delivery; Breast biopsy (Right); other; tubal ligation; knee arthroscopy (Right); d & c (2008); laparoscopic init hernia repair (2009); appendectomy; colonoscopy (05/2003); egd (05/2003); egd (09/2008); excisional biospy right (Right, 8/29/19); other (Right, 8/29/19); jay localization wire 1 site right (cpt=19281) (2001); jay localization wire 1 site left (cpt=19281) (2007); and needle biopsy right (08/29/2019).   Family History:  Her family history includes Cancer in her brother; Dementia in an other family member. She was adopted.  Social History:  She  reports that she has quit smoking. Her smoking use included cigarettes. She has never used smokeless tobacco. She reports current alcohol use. She reports that she does not use drugs.    Tobacco:      CAGE Alcohol Screen:   CAGE screening score of 0 on 6/7/2024, showing low risk of alcohol abuse.      Patient Care Team:  Kyra Ovalle MD as PCP - General (Internal Medicine)  Mariano Kay (Physical Therapy)  Zackary  Hamzah, PT as Physical Therapist (Physical Therapy)  Julia Gallardo PTA as Physical Therapy Assistant (Physical Therapy)    Review of Systems   Constitutional: Negative.    HENT:  Positive for trouble swallowing.    Eyes: Negative.    Respiratory: Negative.     Cardiovascular: Negative.    Gastrointestinal: Negative.    Genitourinary: Negative.    Musculoskeletal: Negative.    Skin: Negative.    Neurological: Negative.    Psychiatric/Behavioral: Negative.           Objective:   Physical Exam  Constitutional:       Appearance: She is well-developed.   HENT:      Head: Normocephalic and atraumatic.      Right Ear: External ear normal.      Left Ear: External ear normal.      Nose: Nose normal.   Eyes:      Conjunctiva/sclera: Conjunctivae normal.      Pupils: Pupils are equal, round, and reactive to light.   Cardiovascular:      Rate and Rhythm: Normal rate and regular rhythm.      Heart sounds: Normal heart sounds.   Pulmonary:      Effort: Pulmonary effort is normal.      Breath sounds: Normal breath sounds.   Abdominal:      General: Bowel sounds are normal.      Palpations: Abdomen is soft.   Genitourinary:     Vagina: Normal.   Musculoskeletal:         General: Normal range of motion.      Cervical back: Normal range of motion and neck supple.   Skin:     General: Skin is warm and dry.   Neurological:      Mental Status: She is alert and oriented to person, place, and time.      Deep Tendon Reflexes: Reflexes are normal and symmetric.   Psychiatric:         Behavior: Behavior normal.         Thought Content: Thought content normal.         Judgment: Judgment normal.           /80 (BP Location: Left arm, Patient Position: Sitting, Cuff Size: adult)   Pulse 67   Temp 97.7 °F (36.5 °C) (Oral)   Resp 17   Ht 5' (1.524 m)   Wt 164 lb (74.4 kg)   SpO2 98%   BMI 32.03 kg/m²  Estimated body mass index is 32.03 kg/m² as calculated from the following:    Height as of this encounter: 5' (1.524 m).    Weight as of  this encounter: 164 lb (74.4 kg).    Medicare Hearing Assessment:   Hearing Screening    Screening Method: Questionnaire  I have a problem hearing over the telephone: No I have trouble following the conversations when two or more people are talking at the same time: No   I have trouble understanding things on the TV: No I have to strain to understand conversations: No   I have to worry about missing the telephone ring or doorbell: No I have trouble hearing conversations in a noisy background such as a crowded room or restaurant: No   I get confused about where sounds come from: No I misunderstand some words in a sentence and need to ask people to repeat themselves: No   I especially have trouble understanding the speech of women and children: No I have trouble understanding the speaker in a large room such as at a meeting or place of Christian: No   Many people I talk to seem to mumble (or don't speak clearly): No People get annoyed because I misunderstand what they say: No   I misunderstand what others are saying and make inappropriate responses: No I avoid social activities because I cannot hear well and fear I will reply improperly: No   Family members and friends have told me they think I may have hearing loss: No             Visual Acuity:   Right Eye Visual Acuity: Corrected Right Eye Chart Acuity: 20/25   Left Eye Visual Acuity: Corrected Left Eye Chart Acuity: 20/25   Both Eyes Visual Acuity: Corrected Both Eyes Chart Acuity: 20/25   Able To Tolerate Visual Acuity: Yes        Assessment & Plan:   Akua Carroll is a 78 year old female who presents for a Medicare Assessment.     1. Medicare annual wellness visit, subsequent (Primary)-exam is okay will order labs  -     CBC With Differential With Platelet; Future; Expected date: 06/07/2024  -     Comp Metabolic Panel (14); Future; Expected date: 06/07/2024  -     Lipid Panel; Future; Expected date: 06/07/2024  -     TSH W Reflex To Free T4; Future; Expected  date: 06/07/2024  -     Urinalysis, Routine; Future; Expected date: 06/07/2024  -     Vitamin D; Future; Expected date: 06/07/2024  2. Thyroid nodule stable  -     CBC With Differential With Platelet; Future; Expected date: 06/07/2024  -     Comp Metabolic Panel (14); Future; Expected date: 06/07/2024  -     Lipid Panel; Future; Expected date: 06/07/2024  -     TSH W Reflex To Free T4; Future; Expected date: 06/07/2024  -     Urinalysis, Routine; Future; Expected date: 06/07/2024  -     Vitamin D; Future; Expected date: 06/07/2024  3. Essential hypertension well-controlled continue current treatment  -     CBC With Differential With Platelet; Future; Expected date: 06/07/2024  -     Comp Metabolic Panel (14); Future; Expected date: 06/07/2024  -     Lipid Panel; Future; Expected date: 06/07/2024  -     TSH W Reflex To Free T4; Future; Expected date: 06/07/2024  -     Urinalysis, Routine; Future; Expected date: 06/07/2024  -     Vitamin D; Future; Expected date: 06/07/2024  4. Gastroesophageal reflux disease, unspecified whether esophagitis present continue with medication watch diet  -     CBC With Differential With Platelet; Future; Expected date: 06/07/2024  -     Comp Metabolic Panel (14); Future; Expected date: 06/07/2024  -     Lipid Panel; Future; Expected date: 06/07/2024  -     TSH W Reflex To Free T4; Future; Expected date: 06/07/2024  -     Urinalysis, Routine; Future; Expected date: 06/07/2024  -     Vitamin D; Future; Expected date: 06/07/2024  5. Dysphagia, unspecified type will order barium swallow will refer to GI  -     Cancel: Gastro Referral - In Network  -     XR UGI/ESOPHAGUS DOUBLE CONTRAST (CPT=74246); Future; Expected date: 06/07/2024  -     Gastro Referral - In Network  -     CBC With Differential With Platelet; Future; Expected date: 06/07/2024  -     Comp Metabolic Panel (14); Future; Expected date: 06/07/2024  -     Lipid Panel; Future; Expected date: 06/07/2024  -     TSH W Reflex To Free  T4; Future; Expected date: 06/07/2024  -     Urinalysis, Routine; Future; Expected date: 06/07/2024  -     Vitamin D; Future; Expected date: 06/07/2024  6. Osteopenia, unspecified location stable med management   Overview:  DEXA 4/2011  Orders:  -     CBC With Differential With Platelet; Future; Expected date: 06/07/2024  -     Comp Metabolic Panel (14); Future; Expected date: 06/07/2024  -     Lipid Panel; Future; Expected date: 06/07/2024  -     TSH W Reflex To Free T4; Future; Expected date: 06/07/2024  -     Urinalysis, Routine; Future; Expected date: 06/07/2024  -     Vitamin D; Future; Expected date: 06/07/2024  7. Primary osteoarthritis of both knees as needed medication for pain   -     CBC With Differential With Platelet; Future; Expected date: 06/07/2024  -     Comp Metabolic Panel (14); Future; Expected date: 06/07/2024  -     Lipid Panel; Future; Expected date: 06/07/2024  -     TSH W Reflex To Free T4; Future; Expected date: 06/07/2024  -     Urinalysis, Routine; Future; Expected date: 06/07/2024  -     Vitamin D; Future; Expected date: 06/07/2024  8. Age-related nuclear cataract of both eyes stable follows with eye specialist  -     CBC With Differential With Platelet; Future; Expected date: 06/07/2024  -     Comp Metabolic Panel (14); Future; Expected date: 06/07/2024  -     Lipid Panel; Future; Expected date: 06/07/2024  -     TSH W Reflex To Free T4; Future; Expected date: 06/07/2024  -     Urinalysis, Routine; Future; Expected date: 06/07/2024  -     Vitamin D; Future; Expected date: 06/07/2024  9. Encounter for preventive care will refer to GI  -     Gastro Referral - In Network  -     CBC With Differential With Platelet; Future; Expected date: 06/07/2024  -     Comp Metabolic Panel (14); Future; Expected date: 06/07/2024  -     Lipid Panel; Future; Expected date: 06/07/2024  -     TSH W Reflex To Free T4; Future; Expected date: 06/07/2024  -     Urinalysis, Routine; Future; Expected date:  06/07/2024  -     Vitamin D; Future; Expected date: 06/07/2024  10. Mammary duct ectasia of right breast stable  -     Vitamin D; Future; Expected date: 06/07/2024  11. Vitamin D deficiency we will retest  -     Vitamin D; Future; Expected date: 06/07/2024  12. History of TIA (transient ischemic attack) medical management  13. Hyperlipidemia, unspecified hyperlipidemia type will retest watch diet take medication   14. Generalized anxiety disorder stable  15. Hyperthyroidism stable follows with endocrine    Overview:  Rib xray 9/2013    The patient indicates understanding of these issues and agrees to the plan.        No follow-ups on file.     Thompson Ovalle MD, 6/7/2024     Supplementary Documentation:   General Health:  In the past six months, have you lost more than 10 pounds without trying?: 2 - No  Has your appetite been poor?: No  Type of Diet: Balanced  How does the patient maintain a good energy level?: Appropriate Exercise  How would you describe your daily physical activity?: Light  How would you describe your current health state?: Good  How do you maintain positive mental well-being?: Visiting Family;Visiting Friends;Social Interaction  On a scale of 0 to 10, with 0 being no pain and 10 being severe pain, what is your pain level?: 0 - (None)  In the past six months, have you experienced urine leakage?: 0-No  At any time do you feel concerned for the safety/well-being of yourself and/or your children, in your home or elsewhere?: No  Have you had any immunizations at another office such as Influenza, Hepatitis B, Tetanus, or Pneumococcal?: No       Akua Carroll's SCREENING SCHEDULE   Tests on this list are recommended by your physician but may not be covered, or covered at this frequency, by your insurer.   Please check with your insurance carrier before scheduling to verify coverage.   PREVENTATIVE SERVICES FREQUENCY &  COVERAGE DETAILS LAST COMPLETION DATE   Diabetes Screening    Fasting Blood Sugar  /  Glucose    One screening every 12 months if never tested or if previously tested but not diagnosed with pre-diabetes   One screening every 6 months if diagnosed with pre-diabetes Lab Results   Component Value Date    GLU 89 06/12/2024        Cardiovascular Disease Screening    Lipid Panel  Cholesterol  Lipoprotein (HDL)  Triglycerides Covered every 5 years for all Medicare beneficiaries without apparent signs or symptoms of cardiovascular disease Lab Results   Component Value Date    CHOLEST 178 06/12/2024    HDL 57 06/12/2024     (H) 06/12/2024    TRIG 90 06/12/2024         Electrocardiogram (EKG)   Covered if needed at Welcome to Medicare, and non-screening if indicated for medical reasons 11/12/2021      Ultrasound Screening for Abdominal Aortic Aneurysm (AAA) Covered once in a lifetime for one of the following risk factors    Men who are 65-75 years old and have ever smoked    Anyone with a family history -     Colorectal Cancer Screening  Covered for ages 50-85; only need ONE of the following:    Colonoscopy   Covered every 10 years    Covered every 2 years if patient is at high risk or previous colonoscopy was abnormal -    Health Maintenance   Topic Date Due    Colorectal Cancer Screening  Discontinued       Flexible Sigmoidoscopy   Covered every 4 years -    Fecal Occult Blood Test Covered annually -   Bone Density Screening    Bone density screening    Covered every 2 years after age 65 if diagnosed with risk of osteoporosis or estrogen deficiency.    Covered yearly for long-term glucocorticoid medication use (Steroids) Last Dexa Scan:    XR DEXA BONE DENSITOMETRY (CPT=77080) 07/31/2021      No recommendations at this time   Pap and Pelvic    Pap   Covered every 2 years for women at normal risk; Annually if at high risk -  No recommendations at this time    Chlamydia Annually if high risk -  No recommendations at this time   Screening Mammogram    Mammogram     Recommend annually for all female  patients aged 40 and older    One baseline mammogram covered for patients aged 35-39 08/17/2023    Health Maintenance   Topic Date Due    Mammogram  Discontinued       Immunizations    Influenza Covered once per flu season  Please get every year 09/14/2023  No recommendations at this time    Pneumococcal Each vaccine ([id number] & [id number]) covered once after 65 Prevnar 13: 06/24/2015    Vdsvswira58: 07/05/2017     No recommendations at this time    Hepatitis B One screening covered for patients with certain risk factors   -  No recommendations at this time    Tetanus Toxoid Not covered by Medicare Part B unless medically necessary (cut with metal); may be covered with your pharmacy prescription benefits -    Tetanus, Diptheria and Pertusis TD and TDaP Not covered by Medicare Part B -  No recommendations at this time    Zoster Not covered by Medicare Part B; may be covered with your pharmacy  prescription benefits 10/18/2012  No recommendations at this time     Annual Monitoring of Persistent Medications (ACE/ARB, digoxin diuretics, anticonvulsants)    Potassium Annually Lab Results   Component Value Date    K 4.0 06/12/2024         Creatinine   Annually Lab Results   Component Value Date    CREATSERUM 0.80 06/12/2024         BUN Annually Lab Results   Component Value Date    BUN 21 06/12/2024       Drug Serum Conc Annually No results found for: \"DIGOXIN\", \"DIG\", \"VALP\"

## 2024-06-12 ENCOUNTER — LAB ENCOUNTER (OUTPATIENT)
Dept: LAB | Age: 79
End: 2024-06-12
Attending: INTERNAL MEDICINE
Payer: MEDICARE

## 2024-06-12 DIAGNOSIS — E04.1 THYROID NODULE: ICD-10-CM

## 2024-06-12 DIAGNOSIS — K21.9 GASTROESOPHAGEAL REFLUX DISEASE, UNSPECIFIED WHETHER ESOPHAGITIS PRESENT: ICD-10-CM

## 2024-06-12 DIAGNOSIS — H25.13 AGE-RELATED NUCLEAR CATARACT OF BOTH EYES: ICD-10-CM

## 2024-06-12 DIAGNOSIS — M17.0 PRIMARY OSTEOARTHRITIS OF BOTH KNEES: ICD-10-CM

## 2024-06-12 DIAGNOSIS — R13.10 DYSPHAGIA, UNSPECIFIED TYPE: ICD-10-CM

## 2024-06-12 DIAGNOSIS — Z00.00 MEDICARE ANNUAL WELLNESS VISIT, SUBSEQUENT: ICD-10-CM

## 2024-06-12 DIAGNOSIS — I10 ESSENTIAL HYPERTENSION: ICD-10-CM

## 2024-06-12 DIAGNOSIS — Z00.00 ENCOUNTER FOR PREVENTIVE CARE: ICD-10-CM

## 2024-06-12 DIAGNOSIS — M85.80 OSTEOPENIA, UNSPECIFIED LOCATION: ICD-10-CM

## 2024-06-12 DIAGNOSIS — E55.9 VITAMIN D DEFICIENCY: ICD-10-CM

## 2024-06-12 DIAGNOSIS — N60.41 MAMMARY DUCT ECTASIA OF RIGHT BREAST: ICD-10-CM

## 2024-06-12 LAB
ALBUMIN SERPL-MCNC: 4.5 G/DL (ref 3.2–4.8)
ALBUMIN/GLOB SERPL: 1.7 {RATIO} (ref 1–2)
ALP LIVER SERPL-CCNC: 117 U/L
ALT SERPL-CCNC: 26 U/L
ANION GAP SERPL CALC-SCNC: 4 MMOL/L (ref 0–18)
AST SERPL-CCNC: 21 U/L (ref ?–34)
BASOPHILS # BLD AUTO: 0.06 X10(3) UL (ref 0–0.2)
BASOPHILS NFR BLD AUTO: 0.9 %
BILIRUB SERPL-MCNC: 0.7 MG/DL (ref 0.2–1.1)
BILIRUB UR QL: NEGATIVE
BUN BLD-MCNC: 21 MG/DL (ref 9–23)
BUN/CREAT SERPL: 26.3 (ref 10–20)
CALCIUM BLD-MCNC: 10 MG/DL (ref 8.7–10.4)
CHLORIDE SERPL-SCNC: 107 MMOL/L (ref 98–112)
CHOLEST SERPL-MCNC: 178 MG/DL (ref ?–200)
CLARITY UR: CLEAR
CO2 SERPL-SCNC: 31 MMOL/L (ref 21–32)
CREAT BLD-MCNC: 0.8 MG/DL
DEPRECATED RDW RBC AUTO: 44.8 FL (ref 35.1–46.3)
EGFRCR SERPLBLD CKD-EPI 2021: 76 ML/MIN/1.73M2 (ref 60–?)
EOSINOPHIL # BLD AUTO: 0.28 X10(3) UL (ref 0–0.7)
EOSINOPHIL NFR BLD AUTO: 4.2 %
ERYTHROCYTE [DISTWIDTH] IN BLOOD BY AUTOMATED COUNT: 14 % (ref 11–15)
FASTING PATIENT LIPID ANSWER: YES
FASTING STATUS PATIENT QL REPORTED: YES
GLOBULIN PLAS-MCNC: 2.7 G/DL (ref 2–3.5)
GLUCOSE BLD-MCNC: 89 MG/DL (ref 70–99)
GLUCOSE UR-MCNC: NORMAL MG/DL
HCT VFR BLD AUTO: 43.7 %
HDLC SERPL-MCNC: 57 MG/DL (ref 40–59)
HGB BLD-MCNC: 14.8 G/DL
HGB UR QL STRIP.AUTO: NEGATIVE
IMM GRANULOCYTES # BLD AUTO: 0.01 X10(3) UL (ref 0–1)
IMM GRANULOCYTES NFR BLD: 0.2 %
KETONES UR-MCNC: NEGATIVE MG/DL
LDLC SERPL CALC-MCNC: 105 MG/DL (ref ?–100)
LEUKOCYTE ESTERASE UR QL STRIP.AUTO: 25
LYMPHOCYTES # BLD AUTO: 1.72 X10(3) UL (ref 1–4)
LYMPHOCYTES NFR BLD AUTO: 25.9 %
MCH RBC QN AUTO: 29.5 PG (ref 26–34)
MCHC RBC AUTO-ENTMCNC: 33.9 G/DL (ref 31–37)
MCV RBC AUTO: 87.1 FL
MONOCYTES # BLD AUTO: 0.33 X10(3) UL (ref 0.1–1)
MONOCYTES NFR BLD AUTO: 5 %
NEUTROPHILS # BLD AUTO: 4.23 X10 (3) UL (ref 1.5–7.7)
NEUTROPHILS # BLD AUTO: 4.23 X10(3) UL (ref 1.5–7.7)
NEUTROPHILS NFR BLD AUTO: 63.8 %
NITRITE UR QL STRIP.AUTO: NEGATIVE
NONHDLC SERPL-MCNC: 121 MG/DL (ref ?–130)
OSMOLALITY SERPL CALC.SUM OF ELEC: 296 MOSM/KG (ref 275–295)
PH UR: 6 [PH] (ref 5–8)
PLATELET # BLD AUTO: 231 10(3)UL (ref 150–450)
POTASSIUM SERPL-SCNC: 4 MMOL/L (ref 3.5–5.1)
PROT SERPL-MCNC: 7.2 G/DL (ref 5.7–8.2)
PROT UR-MCNC: NEGATIVE MG/DL
RBC # BLD AUTO: 5.02 X10(6)UL
SODIUM SERPL-SCNC: 142 MMOL/L (ref 136–145)
SP GR UR STRIP: 1.02 (ref 1–1.03)
T3FREE SERPL-MCNC: 3.3 PG/ML (ref 2.4–4.2)
T4 FREE SERPL-MCNC: 1.1 NG/DL (ref 0.8–1.7)
TRIGL SERPL-MCNC: 90 MG/DL (ref 30–149)
TSI SER-ACNC: 0.5 MIU/ML (ref 0.55–4.78)
UROBILINOGEN UR STRIP-ACNC: NORMAL
VIT D+METAB SERPL-MCNC: 35.3 NG/ML (ref 30–100)
VLDLC SERPL CALC-MCNC: 15 MG/DL (ref 0–30)
WBC # BLD AUTO: 6.6 X10(3) UL (ref 4–11)

## 2024-06-12 PROCEDURE — 36415 COLL VENOUS BLD VENIPUNCTURE: CPT

## 2024-06-12 PROCEDURE — 84481 FREE ASSAY (FT-3): CPT

## 2024-06-12 PROCEDURE — 80061 LIPID PANEL: CPT

## 2024-06-12 PROCEDURE — 84439 ASSAY OF FREE THYROXINE: CPT

## 2024-06-12 PROCEDURE — 85025 COMPLETE CBC W/AUTO DIFF WBC: CPT

## 2024-06-12 PROCEDURE — 84443 ASSAY THYROID STIM HORMONE: CPT

## 2024-06-12 PROCEDURE — 80053 COMPREHEN METABOLIC PANEL: CPT

## 2024-06-12 PROCEDURE — 82306 VITAMIN D 25 HYDROXY: CPT

## 2024-06-12 PROCEDURE — 81001 URINALYSIS AUTO W/SCOPE: CPT

## 2024-06-13 PROBLEM — R55 SYNCOPE: Status: RESOLVED | Noted: 2023-12-15 | Resolved: 2024-06-13

## 2024-06-13 PROBLEM — E05.90 SUBCLINICAL HYPERTHYROIDISM: Status: RESOLVED | Noted: 2019-08-06 | Resolved: 2024-06-13

## 2024-06-13 PROBLEM — J44.89 ASTHMA WITH COPD (CHRONIC OBSTRUCTIVE PULMONARY DISEASE) (HCC): Chronic | Status: ACTIVE | Noted: 2024-06-13

## 2024-06-13 PROBLEM — J44.89 ASTHMA WITH COPD (CHRONIC OBSTRUCTIVE PULMONARY DISEASE) (HCC): Chronic | Status: RESOLVED | Noted: 2019-05-14 | Resolved: 2024-06-13

## 2024-06-13 NOTE — PATIENT INSTRUCTIONS
Akua Carroll's SCREENING SCHEDULE   Tests on this list are recommended by your physician but may not be covered, or covered at this frequency, by your insurer.   Please check with your insurance carrier before scheduling to verify coverage.   PREVENTATIVE SERVICES FREQUENCY &  COVERAGE DETAILS LAST COMPLETION DATE   Diabetes Screening    Fasting Blood Sugar /  Glucose    One screening every 12 months if never tested or if previously tested but not diagnosed with pre-diabetes   One screening every 6 months if diagnosed with pre-diabetes Lab Results   Component Value Date    GLU 89 06/12/2024        Cardiovascular Disease Screening    Lipid Panel  Cholesterol  Lipoprotein (HDL)  Triglycerides Covered every 5 years for all Medicare beneficiaries without apparent signs or symptoms of cardiovascular disease Lab Results   Component Value Date    CHOLEST 178 06/12/2024    HDL 57 06/12/2024     (H) 06/12/2024    TRIG 90 06/12/2024         Electrocardiogram (EKG)   Covered if needed at Welcome to Medicare, and non-screening if indicated for medical reasons 11/12/2021      Ultrasound Screening for Abdominal Aortic Aneurysm (AAA) Covered once in a lifetime for one of the following risk factors   • Men who are 65-75 years old and have ever smoked   • Anyone with a family history -     Colorectal Cancer Screening  Covered for ages 50-85; only need ONE of the following:    Colonoscopy   Covered every 10 years    Covered every 2 years if patient is at high risk or previous colonoscopy was abnormal -    Health Maintenance   Topic Date Due   • Colorectal Cancer Screening  Discontinued       Flexible Sigmoidoscopy   Covered every 4 years -    Fecal Occult Blood Test Covered annually -   Bone Density Screening    Bone density screening    Covered every 2 years after age 65 if diagnosed with risk of osteoporosis or estrogen deficiency.    Covered yearly for long-term glucocorticoid medication use (Steroids) Last Dexa  Scan:    XR DEXA BONE DENSITOMETRY (CPT=77080) 07/31/2021      No recommendations at this time   Pap and Pelvic    Pap   Covered every 2 years for women at normal risk; Annually if at high risk -  No recommendations at this time    Chlamydia Annually if high risk -  No recommendations at this time   Screening Mammogram    Mammogram     Recommend annually for all female patients aged 40 and older    One baseline mammogram covered for patients aged 35-39 08/17/2023    Health Maintenance   Topic Date Due   • Mammogram  Discontinued       Immunizations    Influenza Covered once per flu season  Please get every year 09/14/2023  No recommendations at this time    Pneumococcal Each vaccine ([id number] & [id number]) covered once after 65 Prevnar 13: 06/24/2015    [id number]: 07/05/2017     No recommendations at this time    Hepatitis B One screening covered for patients with certain risk factors   -  No recommendations at this time    Tetanus Toxoid Not covered by Medicare Part B unless medically necessary (cut with metal); may be covered with your pharmacy prescription benefits -    Tetanus, Diptheria and Pertusis TD and TDaP Not covered by Medicare Part B -  No recommendations at this time    Zoster Not covered by Medicare Part B; may be covered with your pharmacy  prescription benefits 10/18/2012  No recommendations at this time     Annual Monitoring of Persistent Medications (ACE/ARB, digoxin diuretics, anticonvulsants)    Potassium Annually Lab Results   Component Value Date    K 4.0 06/12/2024         Creatinine   Annually Lab Results   Component Value Date    CREATSERUM 0.80 06/12/2024         BUN Annually Lab Results   Component Value Date    BUN 21 06/12/2024       Drug Serum Conc Annually No results found for: \"DIGOXIN\", \"DIG\", \"VALP\"

## 2024-06-14 DIAGNOSIS — E78.5 HYPERLIPIDEMIA, UNSPECIFIED HYPERLIPIDEMIA TYPE: Primary | ICD-10-CM

## 2024-07-03 ENCOUNTER — HOSPITAL ENCOUNTER (OUTPATIENT)
Dept: GENERAL RADIOLOGY | Facility: HOSPITAL | Age: 79
Discharge: HOME | End: 2024-07-03
Attending: INTERNAL MEDICINE
Payer: MEDICARE

## 2024-07-03 DIAGNOSIS — R13.10 DYSPHAGIA, UNSPECIFIED TYPE: ICD-10-CM

## 2024-07-03 PROCEDURE — 74246 X-RAY XM UPR GI TRC 2CNTRST: CPT | Performed by: INTERNAL MEDICINE

## 2024-07-30 ENCOUNTER — TELEPHONE (OUTPATIENT)
Dept: CASE MANAGEMENT | Age: 79
End: 2024-07-30

## 2024-07-30 ENCOUNTER — TELEPHONE (OUTPATIENT)
Dept: INTERNAL MEDICINE CLINIC | Facility: CLINIC | Age: 79
End: 2024-07-30

## 2024-07-30 DIAGNOSIS — Z12.31 BREAST CANCER SCREENING BY MAMMOGRAM: Primary | ICD-10-CM

## 2024-07-30 NOTE — TELEPHONE ENCOUNTER
Patient called and said we called her and told her to schedule an appointment with Dr Thompson Ovalle.  She scheduled a 6m follow up.      Did Dr Thompson Ovalle want to see patient sooner?  Advise patient if needed.

## 2024-08-07 ENCOUNTER — TELEPHONE (OUTPATIENT)
Facility: CLINIC | Age: 79
End: 2024-08-07

## 2024-08-07 ENCOUNTER — OFFICE VISIT (OUTPATIENT)
Facility: CLINIC | Age: 79
End: 2024-08-07

## 2024-08-07 VITALS — BODY MASS INDEX: 32.79 KG/M2 | WEIGHT: 167 LBS | HEIGHT: 60 IN

## 2024-08-07 DIAGNOSIS — R93.89 ABNORMAL X-RAY: ICD-10-CM

## 2024-08-07 DIAGNOSIS — R13.19 ESOPHAGEAL DYSPHAGIA: ICD-10-CM

## 2024-08-07 DIAGNOSIS — R09.A2 GLOBUS SENSATION: ICD-10-CM

## 2024-08-07 DIAGNOSIS — Z12.11 COLON CANCER SCREENING: ICD-10-CM

## 2024-08-07 DIAGNOSIS — R13.12 OROPHARYNGEAL DYSPHAGIA: ICD-10-CM

## 2024-08-07 DIAGNOSIS — R13.19 ESOPHAGEAL DYSPHAGIA: Primary | ICD-10-CM

## 2024-08-07 DIAGNOSIS — R09.A2 GLOBUS SENSATION: Primary | ICD-10-CM

## 2024-08-07 PROCEDURE — 99204 OFFICE O/P NEW MOD 45 MIN: CPT

## 2024-08-07 RX ORDER — PANTOPRAZOLE SODIUM 40 MG/1
40 TABLET, DELAYED RELEASE ORAL
Qty: 90 TABLET | Refills: 0 | Status: SHIPPED | OUTPATIENT
Start: 2024-08-07 | End: 2024-11-05

## 2024-08-07 RX ORDER — SODIUM, POTASSIUM,MAG SULFATES 17.5-3.13G
SOLUTION, RECONSTITUTED, ORAL ORAL
Qty: 1 EACH | Refills: 0 | Status: SHIPPED | OUTPATIENT
Start: 2024-08-07

## 2024-08-07 NOTE — PATIENT INSTRUCTIONS
- call to schedule video swallow study #[phone]    -follow up with me 2-4 weeks after endoscopy       1. Schedule EGD & colonoscopy with [facility] pool endoscopist  Diagnosis: CRC screen,  dysphagia, globus sensation, abnormal xray esophagus  Sedation: MAC  Prep: split dose suprep    2.  bowel prep from pharmacy   You can pick the bowel prep up now and store in a cool, dry place in your home until your scheduled bowel prep start date.    3. Continue all medications as normal for your procedure. DO NOT TAKE: Any form of alcohol, recreational drugs and any forms of erectile dysfunction medications 24 hours prior to procedure.    4. Read all bowel prep instructions carefully. Bowel prep instructions can also be found online at:  [email or web address]     5. AVOID seeds, nuts, popcorn, raw fruits and vegetables for 5 days before procedure    6. If you start any NEW medication after your visit today, please notify us. Certain medications (like iron or weight loss medications) will need to be held before the procedure, or the procedure cannot be performed safely.     
No

## 2024-08-07 NOTE — H&P
[facility] - Gastroenterology                                                                                                               Reason for consult: dysphagia    Requesting physician or provider: RAJIV SCHWARTZ MD    Chief Complaint   Patient presents with    Consult     dysphagia       HPI:   Akua Carroll is a 78 year old year-old female with active diagnoses including asthma, hyperlipidemia, hypertension, thyroid disorder, osteoarthritis, heart murmur, vitamin D deficiency, osteopenia. Prior medical/surgical history in note table.    she is here today for evaluation  #dysphagia  #globus sensation  -reports esophageal dysphagia for years described as food getting stuck in mid chest and oral-pharyngeal dysphagia described as trouble swallowing food or saliva. Symptoms worse if she eats too fast. When eating has globus sensation bottom of neck. Denies typical GERD symptoms.  -xray esophagus in July showed moderate size hiatal hernia, irregularity at GE junction, moderate GERD. Previous hiatal hernia repair around 2009.   -diet recall: breakfast: frozen breakfast sandwich, toast // lunch or dinner: pizza, fast food, sometimes meat, veggie, potato meal // snack: none // drinks: water, tonic water, sometimes a coffee. Does not eat much fruits/veggies. Reports she doesn't eat a lot to control her weight, often only eats 2 meals.   -has daily bowel movement, brown and formed    Patient denies symptoms of nausea, vomiting, dyspepsia, odynophagia, heartburn, hematemesis, abdominal pain, change in bowel habits, constipation, diarrhea, hematochezia, or melena. she denies recent change in appetite, fever or unintentional weight loss.      Last colonoscopy: ~2003  Last EGD: ~2008  Endoscopic ultrasound - 2019 for bile duct abnormality on imaging     NSAIDS: none   Tobacco: former  Alcohol: wine 1x weekly  Marijuana:  none   Illicit drugs: none     FH GI malignancy: none   FH IBD: none     No history of adverse reaction to sedation  No YOVANA  No anticoagulants/antiplatelet  No pacemaker/defibrillator    Wt Readings from Last 6 Encounters:   08/07/24 167 lb (75.8 kg)   06/07/24 164 lb (74.4 kg)   12/01/23 165 lb (74.8 kg)   10/16/23 170 lb (77.1 kg)   05/09/23 170 lb (77.1 kg)   05/04/23 170 lb (77.1 kg)        History, Medications, Allergies, ROS:      Past Medical History:    Appendicitis    Asthma (HCC)    Cholelithiasis    Extrinsic asthma, unspecified    Hiatal hernia    History of shingles    Osteoarthrosis, unspecified whether generalized or localized, unspecified site    Osteoporosis, senile    Postmenopausal bleeding    Torn meniscus    Unspecified essential hypertension    Visual impairment    glasses      Past Surgical History:   Procedure Laterality Date    Appendectomy      Breast biopsy Right     Cholecystectomy      Colonoscopy  05/2003    Sait    D & c  2008    D&c after delivery      Egd  05/2003    Sait    Egd  09/2008    hiatal hernia    Excisional biospy right Right 8/29/19    Reight breast terminal duct excision and correction of nipple inversion    Knee arthroscopy Right     Laparoscopic init hernia repair  2009    paraesophageal    Mikie localization wire 1 site left (cpt=19281)  2007    Mikie localization wire 1 site right (cpt=19281)  2001    Needle biopsy right  08/29/2019    Surgery for inverted nipple Dr. Balderas    Other      excision of left breast plate    Other Right 8/29/19    Right terminal duct excision with correction of nipple inversion    Tubal ligation        Family Hx:   Family History   Adopted: Yes   Problem Relation Age of Onset    Cancer Brother         Lung    Dementia Other     Breast Cancer Neg     Ovarian Cancer Neg       Social History:   Social History     Socioeconomic History    Marital status:     Number of children: 3   Occupational History     Comment: Retired, worked in  health care industry   Tobacco Use    Smoking status: Former     Types: Cigarettes    Smokeless tobacco: Never   Substance and Sexual Activity    Alcohol use: Yes     Alcohol/week: 1.0 standard drink of alcohol     Types: 1 Glasses of wine per week     Comment: occasional wine    Drug use: No   Other Topics Concern    Pt has a pacemaker No    Pt has a defibrillator No    Reaction to local anesthetic No    Caffeine Concern Yes     Comment: Coffee, soda         Medications (Active prior to today's visit):  Current Outpatient Medications   Medication Sig Dispense Refill    Na Sulfate-K Sulfate-Mg Sulf (SUPREP BOWEL PREP KIT) 17.5-3.13-1.6 GM/177ML Oral Solution Take as directed by GI clinic. Okay to substitute for generic. 1 each 0    pantoprazole 40 MG Oral Tab EC Take 1 tablet (40 mg total) by mouth every morning before breakfast. 90 tablet 0    magnesium 250 MG Oral Tab Take 1 tablet (250 mg total) by mouth daily.      metoprolol succinate ER 50 MG Oral Tablet 24 Hr Take 1 tablet (50 mg total) by mouth daily.      cholecalciferol (VITAMIN D3) 10 MCG (400 UNIT) Oral Tab Take 1 tablet (400 Units total) by mouth daily.      atorvastatin 20 MG Oral Tab Take 1 tablet (20 mg total) by mouth nightly. 90 tablet 3    methimazole 5 MG Oral Tab Take 0.5 tablets (2.5 mg total) by mouth 3 (three) times a week.      lisinopril 5 MG Oral Tab Take 1 tablet (5 mg total) by mouth daily. 90 tablet 1       Allergies:  Allergies   Allergen Reactions    Penicillins UNKNOWN     Unsure, from childhood    Wasps OTHER (SEE COMMENTS)     Causes welts    Latex RASH and ITCHING     Latex tape causes rash       ROS:   CONSTITUTIONAL: negative for fevers, chills, sweats  EYES Negative for scleral icterus or redness, and diplopia  HEENT: Negative for hoarseness  RESPIRATORY: Negative for cough and severe shortness of breath  CARDIOVASCULAR: Negative for crushing sub-sternal chest pain  GASTROINTESTINAL: See HPI  GENITOURINARY: Negative for  dysuria  MUSCULOSKELETAL: Negative for arthralgias and myalgias  SKIN: Negative for jaundice, rash or pruritus  NEUROLOGICAL: Negative for dizziness and headaches  BEHAVIOR/PSYCH: Negative for psychotic behavior    PHYSICAL EXAM:   Height 5' (1.524 m), weight 167 lb (75.8 kg), not currently breastfeeding.    GEN: Alert, no acute distress, well-nourished   HEENT: anicteric sclera, neck supple, trachea midline, MMM, no palpable or tender neck or supraclavicular lymph nodes  CV: RRR, the extremities are warm and well perfused   LUNGS: No increased work of breathing, CTAB  ABDOMEN: Soft, symmetrical, non-tender without distention or guarding. No scars or lesions. Aorta is without bruit or visible pulsation. Umbilicus is midline without herniation. Normoactive bowel sounds are present, No masses, hepatomegaly or splenomegaly noted.  MSK: No erythema, no warmth, no swelling of joints  SKIN: No jaundice, no erythema, no rashes, no lesions  HEMATOLOGIC: No bleeding, no bruising  NEURO: Alert and interactive, VALDEZ  PSYCH: appropriate mood & affect    Labs/Imaging/Procedures:     Patient's pertinent labs and imaging were reviewed and discussed with patient today.        .  ASSESSMENT/PLAN:   Akau Carroll is a 78 year old year-old female with active diagnoses including asthma, hyperlipidemia, hypertension, thyroid disorder, osteoarthritis, heart murmur, vitamin D deficiency, osteopenia. Prior medical/surgical history in note table.    she is here today for evaluation  #dysphagia  #globus sensation  -reports esophageal dysphagia for years described as food getting stuck in mid chest and oral-pharyngeal dysphagia described as trouble swallowing food or saliva. Symptoms worse if she eats too fast. When eating has globus sensation bottom of neck. Denies typical GERD symptoms.  -xray esophagus in July showed moderate size hiatal hernia, irregularity at GE junction, moderate GERD. Previous hiatal hernia repair around 2009.    -diet recall: breakfast: frozen breakfast sandwich, toast // lunch or dinner: pizza, fast food, sometimes meat, veggie, potato meal // snack: none // drinks: water, tonic water, sometimes a coffee. Does not eat much fruits/veggies. Reports she doesn't eat a lot to control her weight, often only eats 2 meals.   -has daily bowel movement, brown and formed    #CRC screen  -last colonoscopy >20 years ago. No stool testing. Pt agreeable to colonoscopy for CRC screen    -symptoms may be related to reflux esophagitis from silent GERD. Will start PPI trial. Ordered video swallow study since she also describes oral-pharyngeal dysphagia. EGD to evaluate for stricture and to biopsy. Colonoscopy since she is well overdue for colonoscopy.    Recommendations:  -call to schedule video swallow study #[phone]    -follow up with me 2-4 weeks after endoscopy     -Schedule EGD & colonoscopy with URGENT pool endoscopist  Diagnosis: CRC screen,  dysphagia, globus sensation, abnormal xray esophagus  Sedation: MAC  Prep: split dose suprep    ENDOSCOPIC RISK BENEFIT DISCUSSION: I described the procedure in great detail with the patient. I discussed the risks and benefits, including but not limited to: bleeding, perforation, infection, anesthesia complications, and even death. Patient will be NPO after midnight and will have a person physically present at time of pick-up to drive patient home. Patient verbalized understanding and agrees to proceed with procedure as planned.      Orders This Visit:  No orders of the defined types were placed in this encounter.      Meds This Visit:  Requested Prescriptions     Signed Prescriptions Disp Refills    Na Sulfate-K Sulfate-Mg Sulf (SUPREP BOWEL PREP KIT) 17.5-3.13-1.6 GM/177ML Oral Solution 1 each 0     Sig: Take as directed by GI clinic. Okay to substitute for generic.    pantoprazole 40 MG Oral Tab EC 90 tablet 0     Sig: Take 1 tablet (40 mg total) by mouth every morning before breakfast.        Imaging & Referrals:  XR VIDEO SWALLOW (AKG=[id number])      STERLING Dean    [facility]  8/7/2024        This note was partially prepared using Dragon Medical voice recognition dictation software. As a result, errors may occur. When identified, these errors have been corrected. While every attempt is made to correct errors during dictation, discrepancies may still exist.

## 2024-08-07 NOTE — TELEPHONE ENCOUNTER
Scheduled for:  Colonoscopy 24810/EGD 34103  Provider Name:  Dr Castro  Date:  11/19/2024  Location:  [facility]  Sedation:  mac  Time:  0900 (pt is aware that [facility] will call the day before to confirm arrival time)    Prep:  colyte  Meds/Allergies Reconciled?:  Physician reviewed  Diagnosis with codes:  ccs z12.11; dysphagia r13.10; abnormal xray ; globus sensation r09.82  Was patient informed to call insurance with codes (Y/N):       Referral sent?:  Referral was sent at the time of electronic surgical scheduling.    [facility] or [facility] notified?:  I sent an electronic request to Endo Scheduling and received a confirmation today.   Medication Orders:  Patient is aware to NOT take iron pills, herbal meds and diet supplements for 7 days before exam. Also to NOT take any form of alcohol, recreational drugs and any forms of ED meds 24-72 hours before exam.     Misc Orders:       Further instructions given by staff:  I discussed the prep intructions with the patient in office which she verbally understood. Copy of instructions was handed to patient as well. Patient was also advised about cancellation policy.

## 2024-09-06 ENCOUNTER — HOSPITAL ENCOUNTER (OUTPATIENT)
Dept: MAMMOGRAPHY | Age: 79
Discharge: HOME | End: 2024-09-06
Attending: INTERNAL MEDICINE
Payer: MEDICARE

## 2024-09-06 DIAGNOSIS — Z12.31 BREAST CANCER SCREENING BY MAMMOGRAM: ICD-10-CM

## 2024-09-06 PROCEDURE — 77067 SCR MAMMO BI INCL CAD: CPT | Performed by: INTERNAL MEDICINE

## 2024-09-06 PROCEDURE — 77063 BREAST TOMOSYNTHESIS BI: CPT | Performed by: INTERNAL MEDICINE

## 2024-09-06 NOTE — TELEPHONE ENCOUNTER
Left message for patient to call back regarding wait list offer    Please transfer call to GI surgery scheduling

## 2024-09-11 NOTE — PROGRESS NOTES
Pt has order for discharge from Dr. Graeme Curran. IV site and removed dressing applied to site. Son here to take pt home. Discharge instructions reviewed with pt. Pt stated understanding of discharge instructions. Escorted pt to [facility] via w/c. Render Note In Bullet Format When Appropriate: No Show Applicator Variable?: Yes Number Of Freeze-Thaw Cycles: 2 freeze-thaw cycles Detail Level: Detailed Duration Of Freeze Thaw-Cycle (Seconds): 0 Post-Care Instructions: I reviewed with the patient in detail post-care instructions. Patient is to wear sunprotection, and avoid picking at any of the treated lesions. Pt may apply Vaseline to crusted or scabbing areas. Consent: The patient's consent was obtained including but not limited to risks of crusting, scabbing, blistering, scarring, darker or lighter pigmentary change, recurrence, incomplete removal and infection.

## 2024-09-13 ENCOUNTER — HOSPITAL ENCOUNTER (OUTPATIENT)
Dept: GENERAL RADIOLOGY | Facility: HOSPITAL | Age: 79
Discharge: HOME | End: 2024-09-13
Payer: MEDICARE

## 2024-09-13 ENCOUNTER — TELEPHONE (OUTPATIENT)
Facility: CLINIC | Age: 79
End: 2024-09-13

## 2024-09-13 DIAGNOSIS — R13.12 OROPHARYNGEAL DYSPHAGIA: ICD-10-CM

## 2024-09-13 PROCEDURE — 74230 X-RAY XM SWLNG FUNCJ C+: CPT

## 2024-09-13 PROCEDURE — 92611 MOTION FLUOROSCOPY/SWALLOW: CPT

## 2024-09-13 NOTE — PATIENT INSTRUCTIONS
VIDEO SWALLOW STUDY    Diet Recommendations:  Solids: Regular, IDDSI 7  Liquids: Thin, IDDSI 0    Recommended compensatory strategies:   Sit upright  Eat slowly  Alternate liquids and solids  Swallow twice with each bite  Remain upright for one hour after eating.    Medication Administration:  No restrictions    Further Follow-up:  No follow up is warranted with this service.  Follow up with STERLING Dean.    Ilana Roman MA/[facility]-SLP  Speech Language Pathologist  [facility]  [phone]

## 2024-09-13 NOTE — PROGRESS NOTES
ADULT VIDEOFLUOROSCOPIC SWALLOWING STUDY       ADULT VIDEOFLUOROSCOPIC SWALLOWING STUDY:   Referring Physician: Aaron      Radiologist: Dr. Ocasio  Diagnosis: dysphagia    Date of Service: 9/13/2024     PATIENT SUMMARY   Chief Complaint: The patient c/o globus and food sticking in her throat.  Her symptoms have been happening for many years.  She had hernia surgery in 2009 and symptoms were better for awhile, but have gradually gotten worse.  She has the most difficulty with bread and rice.  She needs to swallow twice with each bite. Carbonation helps to get the food down.  She denies reflux, shortness of breath, odynophagia and unintentional weight loss.  Esophagram revealed moderate hiatal hernia.  EGD and colonoscopy are scheduled for November.    Current Diet: regular foods and liquids       Problem List  Active Problems:  Active Problems:    * No active hospital problems. *      Past Medical History  Past Medical History:    Appendicitis    Asthma (HCC)    Cholelithiasis    Extrinsic asthma, unspecified    Hiatal hernia    History of shingles    Osteoarthrosis, unspecified whether generalized or localized, unspecified site    Osteoporosis, senile    Postmenopausal bleeding    Torn meniscus    Unspecified essential hypertension    Visual impairment    glasses        Imaging results: 7/3/24 esophagram: 1. Normal cervical esophagus and swallowing.   2. Moderate hiatal hernia with mesh markers from previous hernia repair at the diaphragmatic hiatus.   3. Mild luminal irregularity at the GE junction may reflect underlying reflux esophagitis.  Recommend upper endoscopy.  Moderate gastroesophageal reflux visualized.   4. Posterior gastric fundal diverticulum.  No gastric outlet obstruction.   5. Normal appearing duodenum.     ASSESSMENT   DYSPHAGIA ASSESSMENT  Test completed in conjunction with Radiologist.   Food/Liquid Types Presented: puree, solid, and thin liquids.    Study Position and View:  Patient was  seated upright and viewed laterally and A-P.    Pain Assessment: The patient reports pain at a level of 0/10.    Oral phase:  Adequate bilabial seal with no anterior food or liquid loss.  Bolus formation and containment was generally adequate, however, one episode of premature spillage noted with thin liquid by straw.  No significant oral residue remained.      Pharyngeal phase:  The pharyngeal response triggered at the tongue base for all consistencies, except for one presentation of thin liquid by straw which fell prematurely into the pharynx and thus the swallow triggered at the mid pharynx.  With this presentation, thin liquids penetrated into the laryngeal vestibule before the swallow.  With the completion of the swallow, all material appeared to eject.  Pt with throat clear in response.  No material remained in the airway and no aspiration was observed.  Base of tongue retraction and hyolaryngeal excursion were WNL.  Trace vallecular retention remained.    Esophageal phase:   Adequate flow of bolus through upper esophagus     Penetration Aspiration Scale: 2/8.  Material entered the airway, remained above the vocal cords and was ejected from the airway.    Overall Impression: Essentially normal swallow, but with one episode of premature spillage resulting in laryngeal penetration with thin liquids before the swallow.  The penetration was ejected from the airway with the completion of the swallow.  No material remained in the airway and no aspiration was observed.  No significant pharyngeal retention remained.  Recommend general diet with thin liquids.  Continue with current strategies including alternating consistencies and swallowing twice.    FCM category and level: Swallowing, 7  PLAN   Potential: Good    Diet Recommendations:  Solids: Regular, IDDSI 7  Liquids: Thin, IDDSI 0    Recommended compensatory strategies:   Sit upright  Eat slowly  Alternate liquids and solids  Swallow twice with each bite  Remain  upright for one hour after eating.    Medication Administration:  No restrictions    Further Follow-up:  No follow up is warranted with this service.      EDUCATION/INSTRUCTION  Reviewed results and recommendations with patient.  Written instructions were provided.  Agreement/Understanding verbalized and all questions answered to their apparent satisfaction.      INTERDISCIPLINARY COMMUNICATION  Reviewed results with Radiologist; agreement verbalized.      Thank you for your referral.  If you have any questions, please contact me at [phone].    Ilana Roman MA/[facility]-SLP  Speech Language Pathologist  [facility]  [phone]    Electronically signed by therapist: SHARIFA Mcguire  Physician's certification required: No

## 2024-09-21 NOTE — ED NOTES
Orders for admission, patient is aware of plan and ready to go upstairs.  Any questions, please call ED CRISTOFER Mcgraw  at extension [phone] Yes

## 2024-12-03 ENCOUNTER — OFFICE VISIT (OUTPATIENT)
Dept: INTERNAL MEDICINE CLINIC | Facility: CLINIC | Age: 79
End: 2024-12-03

## 2024-12-03 VITALS
DIASTOLIC BLOOD PRESSURE: 84 MMHG | WEIGHT: 168.63 LBS | BODY MASS INDEX: 33.11 KG/M2 | RESPIRATION RATE: 18 BRPM | TEMPERATURE: 97 F | HEIGHT: 60 IN | SYSTOLIC BLOOD PRESSURE: 130 MMHG | HEART RATE: 68 BPM | OXYGEN SATURATION: 97 %

## 2024-12-03 DIAGNOSIS — E05.90 HYPERTHYROIDISM: ICD-10-CM

## 2024-12-03 DIAGNOSIS — R79.89 ABNORMAL TSH: ICD-10-CM

## 2024-12-03 DIAGNOSIS — M17.0 PRIMARY OSTEOARTHRITIS OF BOTH KNEES: ICD-10-CM

## 2024-12-03 DIAGNOSIS — I10 ESSENTIAL HYPERTENSION: Primary | ICD-10-CM

## 2024-12-03 DIAGNOSIS — K21.9 GASTROESOPHAGEAL REFLUX DISEASE, UNSPECIFIED WHETHER ESOPHAGITIS PRESENT: ICD-10-CM

## 2024-12-03 PROBLEM — J44.89 ASTHMA WITH COPD (CHRONIC OBSTRUCTIVE PULMONARY DISEASE) (HCC): Chronic | Status: RESOLVED | Noted: 2024-06-13 | Resolved: 2024-12-03

## 2024-12-03 LAB
CHOLEST SERPL-MCNC: 155 MG/DL (ref ?–200)
FASTING PATIENT LIPID ANSWER: YES
HDLC SERPL-MCNC: 51 MG/DL (ref 40–59)
LDLC SERPL CALC-MCNC: 90 MG/DL (ref ?–100)
NONHDLC SERPL-MCNC: 104 MG/DL (ref ?–130)
T4 FREE SERPL-MCNC: 1.1 NG/DL (ref 0.8–1.7)
TRIGL SERPL-MCNC: 72 MG/DL (ref 30–149)
TSI SER-ACNC: 0.19 UIU/ML (ref 0.55–4.78)
VLDLC SERPL CALC-MCNC: 12 MG/DL (ref 0–30)

## 2024-12-03 PROCEDURE — 36415 COLL VENOUS BLD VENIPUNCTURE: CPT | Performed by: INTERNAL MEDICINE

## 2024-12-03 PROCEDURE — 1126F AMNT PAIN NOTED NONE PRSNT: CPT | Performed by: INTERNAL MEDICINE

## 2024-12-03 PROCEDURE — 1160F RVW MEDS BY RX/DR IN RCRD: CPT | Performed by: INTERNAL MEDICINE

## 2024-12-03 PROCEDURE — 1159F MED LIST DOCD IN RCRD: CPT | Performed by: INTERNAL MEDICINE

## 2024-12-03 PROCEDURE — 99214 OFFICE O/P EST MOD 30 MIN: CPT | Performed by: INTERNAL MEDICINE

## 2024-12-03 NOTE — PROGRESS NOTES
Subjective:     Patient ID: Akua Carroll is a 79 year old female.    HPI    Patient comes in for 6-month follow-up overall doing okay denies any complaints  Asking for handicap placard due to having difficulty walking for too far without stopping due to her arthritis on her  knees.     History/Other:   Review of Systems   Constitutional: Negative.    HENT: Negative.     Eyes: Negative.    Respiratory: Negative.     Cardiovascular: Negative.    Gastrointestinal: Negative.    Genitourinary: Negative.    Musculoskeletal:  Positive for arthralgias.   Skin: Negative.    Neurological: Negative.    Psychiatric/Behavioral: Negative.       Current Outpatient Medications   Medication Sig Dispense Refill    magnesium 250 MG Oral Tab Take 1 tablet (250 mg total) by mouth daily.      metoprolol succinate ER 50 MG Oral Tablet 24 Hr Take 1 tablet (50 mg total) by mouth daily.      cholecalciferol (VITAMIN D3) 10 MCG (400 UNIT) Oral Tab Take 1 tablet (400 Units total) by mouth daily.      atorvastatin 20 MG Oral Tab Take 1 tablet (20 mg total) by mouth nightly. 90 tablet 3    methimazole 5 MG Oral Tab Take 0.5 tablets (2.5 mg total) by mouth 3 (three) times a week.      lisinopril 5 MG Oral Tab Take 1 tablet (5 mg total) by mouth daily. 90 tablet 1     Allergies:Allergies[1]    Past Medical History:    Appendicitis    Asthma (HCC)    Cholelithiasis    Disorder of thyroid    Extrinsic asthma, unspecified    Hiatal hernia    High blood pressure    History of shingles    Osteoarthrosis, unspecified whether generalized or localized, unspecified site    Osteoporosis, senile    Postmenopausal bleeding    Stroke (HCC)    TIA    Torn meniscus    Unspecified essential hypertension    Visual impairment    glasses      Past Surgical History:   Procedure Laterality Date    Appendectomy      Breast biopsy Right     Cholecystectomy      Colonoscopy  05/2003    Sait    D & c  2008    D&c after delivery      Egd  05/2003    Sait    Egd   09/2008    hiatal hernia    Excisional biospy right Right 8/29/19    Reight breast terminal duct excision and correction of nipple inversion    Knee arthroscopy Right     Laparoscopic init hernia repair  2009    paraesophageal    Mikie localization wire 1 site left (cpt=19281)  2007    Mikie localization wire 1 site right (cpt=19281)  2001    Needle biopsy right  08/29/2019    Surgery for inverted nipple Dr. Balderas    Other      excision of left breast plate    Other Right 8/29/19    Right terminal duct excision with correction of nipple inversion    Tubal ligation        Family History   Adopted: Yes   Problem Relation Age of Onset    Cancer Brother         Lung    Dementia Other     Breast Cancer Neg     Ovarian Cancer Neg       Social History:   Social History     Socioeconomic History    Marital status:     Number of children: 3   Occupational History     Comment: Retired, worked in health care industry   Tobacco Use    Smoking status: Former     Types: Cigarettes    Smokeless tobacco: Never   Vaping Use    Vaping status: Never Used   Substance and Sexual Activity    Alcohol use: Yes     Alcohol/week: 1.0 standard drink of alcohol     Types: 1 Glasses of wine per week     Comment: occasional wine    Drug use: No   Other Topics Concern    Pt has a pacemaker No    Pt has a defibrillator No    Reaction to local anesthetic No    Caffeine Concern Yes     Comment: Coffee, soda         Objective:   Physical Exam  Vitals and nursing note reviewed.   Constitutional:       Appearance: She is well-developed.   HENT:      Head: Normocephalic and atraumatic.      Right Ear: External ear normal.      Left Ear: External ear normal.      Nose: Nose normal.   Eyes:      Conjunctiva/sclera: Conjunctivae normal.      Pupils: Pupils are equal, round, and reactive to light.   Cardiovascular:      Rate and Rhythm: Normal rate and regular rhythm.      Heart sounds: Normal heart sounds.   Pulmonary:      Effort: Pulmonary effort is  normal.      Breath sounds: Normal breath sounds.   Abdominal:      General: Bowel sounds are normal.      Palpations: Abdomen is soft.   Genitourinary:     Vagina: Normal.   Musculoskeletal:         General: Normal range of motion.      Cervical back: Normal range of motion and neck supple.   Skin:     General: Skin is warm and dry.   Neurological:      Mental Status: She is alert and oriented to person, place, and time.      Deep Tendon Reflexes: Reflexes are normal and symmetric.   Psychiatric:         Behavior: Behavior normal.         Thought Content: Thought content normal.         Judgment: Judgment normal.         Assessment & Plan:   1. Essential hypertension well-controlled continue current treatment.   2. Gastroesophageal reflux disease, unspecified whether esophagitis present had recent EGD done in colonoscopy she has a follow-up with GI on the results   3. Abnormal TSH will retest   4. Primary osteoarthritis of both knees Yanira medication for pain will do the handicap placard   5. Hyperthyroidism will retest labs       Orders Placed This Encounter   Procedures    TSH W Reflex To Free T4    Lipid Panel       Meds This Visit:  Requested Prescriptions      No prescriptions requested or ordered in this encounter       Imaging & Referrals:  None            [1]   Allergies  Allergen Reactions    Penicillins UNKNOWN     Unsure, from childhood    Wasps OTHER (SEE COMMENTS)     Causes welts    Latex RASH and ITCHING     Latex tape causes rash

## 2024-12-04 ENCOUNTER — TELEPHONE (OUTPATIENT)
Facility: CLINIC | Age: 79
End: 2024-12-04

## 2024-12-04 LAB — T3FREE SERPL-MCNC: 3.69 PG/ML (ref 2.4–4.2)

## 2024-12-04 NOTE — TELEPHONE ENCOUNTER
RN called and spoke to pt. Reviewed Dr. Castro's recommendations and reviewed Carlota's comments r/t video swallow done in 9/2024. Pt states she has a clinic f/u next week with Carlota also. Pt verbalized understanding and had no further needs at this time.

## 2024-12-04 NOTE — TELEPHONE ENCOUNTER
----- Message from Jonathan Castro sent at 12/3/2024  3:04 PM CST -----  GI staff: please place recall for colonoscopy in 3 years depending on health and wishes at the time.  No Hpylori noted

## 2024-12-04 NOTE — TELEPHONE ENCOUNTER
Recall colonoscopy in 3 years per Dr. Castro, depending on pt's wishes at that time. Colonoscopy done on 11/19/24.    Health maintenance updated and message sent to pt outreach to repeat colon in 3 years.     RN called pt, no answer so left message. GI office number provided.

## 2024-12-09 ENCOUNTER — TELEPHONE (OUTPATIENT)
Facility: CLINIC | Age: 79
End: 2024-12-09

## 2024-12-09 NOTE — TELEPHONE ENCOUNTER
Received a return call from the patient, date of birth and name verified.    Your Appointments      Thursday December 19, 2024 11:30 AM  Follow Up Visit with STERLING Dean  [facility] ([place], [place]), [facility] [address] [facility] [address]  [facility] [address]  [phone]

## 2024-12-09 NOTE — TELEPHONE ENCOUNTER
Patient originally scheduled with APRN tomorrow, 12/10/2024 but due to APRN's schedule change, patient was contacted to reschedule.  Please call as patient doesn't not want to wait until 1/21/2025 for appointment.  Thank you.

## 2024-12-18 NOTE — PROGRESS NOTES
[facility] - Gastroenterology                                                                                                               Reason for consult: dysphagia    Requesting physician or provider: RAJIV SCHWARTZ MD    Chief Complaint   Patient presents with    Follow - Up       HPI:   Akua Carroll is a 79 year old year-old female with active diagnoses including asthma, hyperlipidemia, hypertension, thyroid disorder, osteoarthritis, heart murmur, vitamin D deficiency, osteopenia. Prior medical/surgical history in note table.    she is here today for follow up  Today:  #dysphagia  #hiatal hernia  -since prior visit symptoms are unchanged. Reports the trouble swallowing is the most bothersome. Feels liquids and solids get \"caught up\" in the back of her throat. Better with double swallowing and alternating liquids & solids.   -s/p VFSS, no dysphagia therapy warranted  -s/p EGD/colonoscopy in November, finding of large hiatal hernia, chronic gastritis, 2 colon polyps. Hemorrhoids & pandiverticulosis   -h/o paraesophageal hernia repair in 2009    Prior visit 8/7/2024  #dysphagia  #globus sensation  -reports esophageal dysphagia for years described as food getting stuck in mid chest and oral-pharyngeal dysphagia described as trouble swallowing food or saliva. Symptoms worse if she eats too fast. When eating has globus sensation bottom of neck. Denies typical GERD symptoms.  -xray esophagus in July showed moderate size hiatal hernia, irregularity at GE junction, moderate GERD. Previous hiatal hernia repair around 2009.   -diet recall: breakfast: frozen breakfast sandwich, toast // lunch or dinner: pizza, fast food, sometimes meat, veggie, potato meal // snack: none // drinks: water, tonic water, sometimes a coffee. Does not eat much fruits/veggies. Reports she doesn't eat a lot to control her weight, often only eats  2 meals.   -has daily bowel movement, brown and formed    Patient denies symptoms of nausea, vomiting, dyspepsia, odynophagia, heartburn, hematemesis, abdominal pain, change in bowel habits, constipation, diarrhea, hematochezia, or melena. she denies recent change in appetite, fever or unintentional weight loss.      Last colonoscopy:   11/19/2024 Dr. Castro - 3 year recall  -2 polyps: 5mm and 12mm. Biopsy tubular adenoma and tubulovillous adenoma.   -pandiverticulosis  -hemorrhoids    ~2003    Last EGD:   11/19/2024 Dr. Castro  -large hiatal hernia  -erythematous gastric mucosa  Comment: Oxyntic mucosa with minimal chronic inflammation.  No active inflammation.  No Helicobacter pylori-like organisms.     ~2008  Endoscopic ultrasound - 2019 for bile duct abnormality on imaging     NSAIDS: none   Tobacco: former  Alcohol: wine 1x weekly  Marijuana: none   Illicit drugs: none     FH GI malignancy: none   FH IBD: none     No history of adverse reaction to sedation  No YOVANA  No anticoagulants/antiplatelet  No pacemaker/defibrillator    Wt Readings from Last 6 Encounters:   12/19/24 170 lb (77.1 kg)   12/03/24 168 lb 9.6 oz (76.5 kg)   11/08/24 167 lb (75.8 kg)   08/07/24 167 lb (75.8 kg)   06/07/24 164 lb (74.4 kg)   12/01/23 165 lb (74.8 kg)        History, Medications, Allergies, ROS:      Past Medical History:    Appendicitis    Asthma (HCC)    Cholelithiasis    Disorder of thyroid    Extrinsic asthma, unspecified    Hiatal hernia    High blood pressure    History of shingles    Osteoarthrosis, unspecified whether generalized or localized, unspecified site    Osteoporosis, senile    Postmenopausal bleeding    Stroke (HCC)    TIA    Torn meniscus    Unspecified essential hypertension    Visual impairment    glasses      Past Surgical History:   Procedure Laterality Date    Appendectomy      Breast biopsy Right     Cholecystectomy      Colonoscopy  05/2003    Sait    D & c  2008    D&c after delivery      Egd  05/2003     Sait    Egd  09/2008    hiatal hernia    Excisional biospy right Right 8/29/19    Reight breast terminal duct excision and correction of nipple inversion    Knee arthroscopy Right     Laparoscopic init hernia repair  2009    paraesophageal    Mikie localization wire 1 site left (cpt=19281)  2007    Mikie localization wire 1 site right (cpt=19281)  2001    Needle biopsy right  08/29/2019    Surgery for inverted nipple Dr. Balderas    Other      excision of left breast plate    Other Right 8/29/19    Right terminal duct excision with correction of nipple inversion    Tubal ligation        Family Hx:   Family History   Adopted: Yes   Problem Relation Age of Onset    Cancer Brother         Lung    Dementia Other     Breast Cancer Neg     Ovarian Cancer Neg       Social History:   Social History     Socioeconomic History    Marital status:     Number of children: 3   Occupational History     Comment: Retired, worked in health care industry   Tobacco Use    Smoking status: Former     Types: Cigarettes    Smokeless tobacco: Never   Vaping Use    Vaping status: Never Used   Substance and Sexual Activity    Alcohol use: Yes     Alcohol/week: 1.0 standard drink of alcohol     Types: 1 Glasses of wine per week     Comment: occasional wine    Drug use: No   Other Topics Concern    Pt has a pacemaker No    Pt has a defibrillator No    Reaction to local anesthetic No    Caffeine Concern Yes     Comment: Coffee, soda         Medications (Active prior to today's visit):  Current Outpatient Medications   Medication Sig Dispense Refill    magnesium 250 MG Oral Tab Take 1 tablet (250 mg total) by mouth daily.      metoprolol succinate ER 50 MG Oral Tablet 24 Hr Take 1 tablet (50 mg total) by mouth daily.      cholecalciferol (VITAMIN D3) 10 MCG (400 UNIT) Oral Tab Take 1 tablet (400 Units total) by mouth daily.      atorvastatin 20 MG Oral Tab Take 1 tablet (20 mg total) by mouth nightly. 90 tablet 3    methimazole 5 MG Oral Tab Take  0.5 tablets (2.5 mg total) by mouth 3 (three) times a week.      lisinopril 5 MG Oral Tab Take 1 tablet (5 mg total) by mouth daily. 90 tablet 1       Allergies:  Allergies   Allergen Reactions    Penicillins UNKNOWN     Unsure, from childhood    Wasps OTHER (SEE COMMENTS)     Causes welts    Latex RASH and ITCHING     Latex tape causes rash       ROS:   CONSTITUTIONAL: negative for fevers, chills, sweats  EYES Negative for scleral icterus or redness, and diplopia  HEENT: Negative for hoarseness  RESPIRATORY: Negative for cough and severe shortness of breath  CARDIOVASCULAR: Negative for crushing sub-sternal chest pain  GASTROINTESTINAL: See HPI  GENITOURINARY: Negative for dysuria  MUSCULOSKELETAL: Negative for arthralgias and myalgias  SKIN: Negative for jaundice, rash or pruritus  NEUROLOGICAL: Negative for dizziness and headaches  BEHAVIOR/PSYCH: Negative for psychotic behavior    PHYSICAL EXAM:   Blood pressure (!) 147/92, pulse 84, height 5' (1.524 m), weight 170 lb (77.1 kg), not currently breastfeeding.    GEN: Alert, no acute distress, well-nourished   HEENT: anicteric sclera, neck supple, trachea midline, MMM, no palpable or tender neck or supraclavicular lymph nodes  CV: RRR, the extremities are warm and well perfused   LUNGS: No increased work of breathing, CTAB  ABDOMEN: Soft, symmetrical, non-tender without distention or guarding. No scars or lesions. Aorta is without bruit or visible pulsation. Umbilicus is midline without herniation. Normoactive bowel sounds are present, No masses, hepatomegaly or splenomegaly noted.  MSK: No erythema, no warmth, no swelling of joints  SKIN: No jaundice, no erythema, no rashes, no lesions  HEMATOLOGIC: No bleeding, no bruising  NEURO: Alert and interactive, VALDEZ  PSYCH: appropriate mood & affect    Labs/Imaging/Procedures:     Patient's pertinent labs and imaging were reviewed and discussed with patient today.        .  ASSESSMENT/PLAN:   Akua RICK Carroll is a 79 year  old year-old female with active diagnoses including asthma, hyperlipidemia, hypertension, thyroid disorder, osteoarthritis, heart murmur, vitamin D deficiency, osteopenia. Prior medical/surgical history in note table.    she is here today for follow up  Today:  #dysphagia  #hiatal hernia  -since prior visit symptoms are unchanged. Reports the trouble swallowing is the most bothersome. Feels liquids and solids get \"caught up\" in the back of her throat. Better with double swallowing and alternating liquids & solids.   -s/p VFSS, no dysphagia therapy warranted  -s/p EGD/colonoscopy in November, finding of large hiatal hernia, chronic gastritis, 2 colon polyps. Hemorrhoids & pandiverticulosis   -h/o paraesophageal hernia repair in 2009    -offered trial of acid reduced medication, pt deferred. Will continue reccs from SLP. Advised to follow up if symptoms change or worsen. Can consider repeat colonoscopy in 3 years per patient preference    Recommendations:  -follow up if symptoms change or worsen    -continue recommendations from swallow evaluation    Recommended compensatory strategies:   Sit upright  Eat slowly  Alternate liquids and solids  Swallow twice with each bite  Remain upright for one hour after eating.    -colonoscopy due in November 2027 if you would like to continue screening for polyps and colorectal cancer      Orders This Visit:  No orders of the defined types were placed in this encounter.      Meds This Visit:  Requested Prescriptions      No prescriptions requested or ordered in this encounter       Imaging & Referrals:  None      STERLING Dean    [facility]  12/19/2024        This note was partially prepared using Dragon Medical voice recognition dictation software. As a result, errors may occur. When identified, these errors have been corrected. While every attempt is made to correct errors during dictation, discrepancies may still exist.

## 2024-12-19 ENCOUNTER — OFFICE VISIT (OUTPATIENT)
Facility: CLINIC | Age: 79
End: 2024-12-19

## 2024-12-19 VITALS
WEIGHT: 170 LBS | DIASTOLIC BLOOD PRESSURE: 92 MMHG | BODY MASS INDEX: 33.38 KG/M2 | HEIGHT: 60 IN | SYSTOLIC BLOOD PRESSURE: 147 MMHG | HEART RATE: 84 BPM

## 2024-12-19 DIAGNOSIS — R13.12 OROPHARYNGEAL DYSPHAGIA: Primary | ICD-10-CM

## 2024-12-19 DIAGNOSIS — K44.9 HIATAL HERNIA: ICD-10-CM

## 2024-12-19 PROCEDURE — 99214 OFFICE O/P EST MOD 30 MIN: CPT

## 2024-12-19 PROCEDURE — 1159F MED LIST DOCD IN RCRD: CPT

## 2024-12-19 PROCEDURE — 1160F RVW MEDS BY RX/DR IN RCRD: CPT

## 2024-12-19 NOTE — PATIENT INSTRUCTIONS
-follow up if symptoms change or worsen        -continue recommendations from swallow evaluation    Recommended compensatory strategies:   Sit upright  Eat slowly  Alternate liquids and solids  Swallow twice with each bite  Remain upright for one hour after eating.        -colonoscopy due in November 2027 if you would like to continue screening for polyps and colorectal cancer

## 2024-12-30 ENCOUNTER — MED REC SCAN ONLY (OUTPATIENT)
Dept: INTERNAL MEDICINE CLINIC | Facility: CLINIC | Age: 79
End: 2024-12-30

## 2025-01-15 NOTE — PROGRESS NOTES
[facility]    Progress Note    Bernadette Hernandez Patient Status:  Observation    1945 MRN [id number]   Location [facility]W Attending Arin Soliman MD   Hosp Day # 0 PCP Radha Harden MD        Subjective:     Demi Fermin Pulmonary/Chest: Effort normal and breath sounds normal. No accessory muscle usage or stridor. No apnea, no tachypnea and no bradypnea. She is not intubated. No respiratory distress. She has no decreased breath sounds. She has no wheezes.  She has no rhonch Improving. MRCP noted. GI following pt. Hep. Panel noted. Hypokalemia. Electrolyte protocol. RLQ pain. ? MSK or due to shingles. Try increase gabapentin. And add tizanditine. PT. Check Xrays. Increase ambulation. Possible home. Philip. CONCLUSION:   Diverticulosis without diverticulitis. Postoperative changes of a prior fundoplication with a small recurrent hiatal hernia containing the gastroesophageal junction and gastric fundus.   Mild dilation of the common bile duct and intrahepatic Hematologist,   within 3-5 days  Phone: (   )    -  Fax: (   )    -  Follow Up Time:    Hematologist,   within 3-5 days  Phone: (   )    -  Fax: (   )    -  Follow Up Time:     Rito Moses  Cardiovascular Disease  [address] [place] [address]  Phone: [phone]  Fax: [phone]  Follow Up Time:

## 2025-01-21 ENCOUNTER — TELEPHONE (OUTPATIENT)
Dept: CASE MANAGEMENT | Age: 80
End: 2025-01-21

## 2025-01-21 DIAGNOSIS — M25.562 LEFT KNEE PAIN, UNSPECIFIED CHRONICITY: Primary | ICD-10-CM

## 2025-01-21 DIAGNOSIS — M17.0 PRIMARY OSTEOARTHRITIS OF BOTH KNEES: ICD-10-CM

## 2025-01-21 NOTE — TELEPHONE ENCOUNTER
Dr. Ovalel,     Patient requesting referral to a specialist for knee pain.     She was not sure of what specialty she would need to be seen for this. She did mention regenerative medicine, she is asking for a call to discuss the specialty type, possibly orthopedic.     Please pend referral if you agree with plan of care.     Thank you.  Coral Matos  [facility]

## 2025-01-21 NOTE — TELEPHONE ENCOUNTER
Dr Kyra Ovalle==see below information, pended ortho  and rheumatologist, please choose one. Thanks.         Spoke with the patient.   According to the patient, she has had left knee arthritis for several years now. She is not taking any pain medication except for Magnesium.   She had surgery for her  left knee torn meniscus back in 2009 . Recently, when she walks, she feels pops in and out of her left knee.    She is not interested in having any surgery. She just wants to see a specialist who can examine her knee. She doesn't know if she needs to see an orthopedic or rheumatologist.        Per chart review, the patient saw the rheumatologist on 8/17/2017 due to bilateral knee osteoarthritis.   The last physical therapy for her knee was on 4/30/2019.      LAST VISIT 12/3/24 with Dr Kyra Ovalle   4. Primary osteoarthritis of both knees Yanira medication for pain will do the handicap placard     PROBLEM LIST :  Musculoskeletal and Injuries  Osteopenia  Osteoarthritis of knee      No future appointments.

## 2025-05-11 ENCOUNTER — HOSPITAL ENCOUNTER (OUTPATIENT)
Age: 80
Discharge: HOME | End: 2025-05-11
Attending: EMERGENCY MEDICINE
Payer: MEDICARE

## 2025-05-11 VITALS
OXYGEN SATURATION: 98 % | RESPIRATION RATE: 18 BRPM | TEMPERATURE: 98 F | DIASTOLIC BLOOD PRESSURE: 67 MMHG | SYSTOLIC BLOOD PRESSURE: 152 MMHG | HEART RATE: 59 BPM

## 2025-05-11 DIAGNOSIS — B02.9 HERPES ZOSTER WITHOUT COMPLICATION: Primary | ICD-10-CM

## 2025-05-11 PROCEDURE — 99203 OFFICE O/P NEW LOW 30 MIN: CPT

## 2025-05-11 PROCEDURE — 99213 OFFICE O/P EST LOW 20 MIN: CPT

## 2025-05-11 RX ORDER — VALACYCLOVIR HYDROCHLORIDE 1 G/1
1000 TABLET, FILM COATED ORAL 3 TIMES DAILY
Qty: 21 TABLET | Refills: 0 | Status: SHIPPED | OUTPATIENT
Start: 2025-05-11 | End: 2025-05-18

## 2025-05-11 NOTE — ED INITIAL ASSESSMENT (HPI)
Patient arrives ambulatory with c/o blister to left forearm. Reports she was in [place] and was driving home on Thursday when her arm began itching-no blister at that time. Reports yesterday the area was red and warm to touch and this morning a blister appeared.

## 2025-05-11 NOTE — DISCHARGE INSTRUCTIONS
Valtrex as prescribed.  Claritin for any itching.  Follow-up with your primary MD for any worsening symptoms

## 2025-05-11 NOTE — ED PROVIDER NOTES
Patient Seen in: [facility]      History     Chief Complaint   Patient presents with    Blisters     Stated Complaint: Blisters left arm    Subjective:   HPI    The patient is a 79-year-old female with past history of hypertension, asthma, shingles in the past who presents now with rash to the left arm.  The patient states she initially noted a few lesions to her left index and ring fingers on approximately Thursday.  Today, the patient developed a blisterlike lesion to the dorsal aspect of her left elbow/proximal forearm.  Patient denies any history of bite.  The patient denies any known additional trauma to the skin.  Patient states the lesions are somewhat itchy.      History of Present Illness               Objective:     Past Medical History:    Appendicitis    Asthma (HCC)    Cholelithiasis    Disorder of thyroid    Extrinsic asthma, unspecified    Hiatal hernia    High blood pressure    History of shingles    Osteoarthrosis, unspecified whether generalized or localized, unspecified site    Osteoporosis, senile    Postmenopausal bleeding    Stroke (HCC)    TIA    Torn meniscus    Unspecified essential hypertension    Visual impairment    glasses              Past Surgical History:   Procedure Laterality Date    Appendectomy      Breast biopsy Right     Cholecystectomy      Colonoscopy  05/2003    Sait    D & c  2008    D&c after delivery      Egd  05/2003    Sait    Egd  09/2008    hiatal hernia    Excisional biospy right Right 8/29/19    Reight breast terminal duct excision and correction of nipple inversion    Knee arthroscopy Right     Laparoscopic init hernia repair  2009    paraesophageal    Mikie localization wire 1 site left (cpt=19281)  2007    Mikie localization wire 1 site right (cpt=19281)  2001    Needle biopsy right  08/29/2019    Surgery for inverted nipple Dr. Balderas    Other      excision of left breast plate    Other Right 8/29/19    Right terminal duct excision with correction of  nipple inversion    Tubal ligation                  Social History     Socioeconomic History    Marital status:     Number of children: 3   Occupational History     Comment: Retired, worked in health care industry   Tobacco Use    Smoking status: Former     Types: Cigarettes    Smokeless tobacco: Never   Vaping Use    Vaping status: Never Used   Substance and Sexual Activity    Alcohol use: Yes     Alcohol/week: 1.0 standard drink of alcohol     Types: 1 Glasses of wine per week     Comment: occasional wine    Drug use: No   Other Topics Concern    Pt has a pacemaker No    Pt has a defibrillator No    Reaction to local anesthetic No    Caffeine Concern Yes     Comment: Coffee, soda               Review of Systems    Positive for stated complaint: Blisters left arm  Other systems are as noted in HPI.  Constitutional and vital signs reviewed.      All other systems reviewed and negative except as noted above.                  Physical Exam     ED Triage Vitals [05/11/25 1128]   /67   Pulse 59   Resp 18   Temp 97.8 °F (36.6 °C)   Temp src Oral   SpO2 98 %   O2 Device None (Room air)       Current Vitals:   Vital Signs  BP: 152/67  Pulse: 59  Resp: 18  Temp: 97.8 °F (36.6 °C)  Temp src: Oral    Oxygen Therapy  SpO2: 98 %  O2 Device: None (Room air)        Physical Exam    Constitutional: Well-developed well-nourished in no acute distress  Head: Normocephalic, no swelling or tenderness  Eyes: Nonicteric sclera, no conjunctival injection  ENT: TMs are clear and flat bilaterally.  There is no posterior pharyngeal erythema  Chest: Clear to auscultation, no tenderness  Cardiovascular: Regular rate and rhythm without murmur  Abdomen: Soft, nontender and nondistended  Neurologic: Patient is awake, alert and oriented ×3.  The patient's motor strength is 5 out of 5 and symmetric in the upper and lower extremities bilaterally  Extremities: No focal swelling or tenderness  Skin: Small blisterlike lesions to the dorsal  aspect of the left second finger and larger single blisterlike lesion to the dorsal aspect of the left elbow with few small additional lesions.  No additional lesions in the C7 dermatome    Physical Exam                ED Course   Labs Reviewed - No data to display       Results            Was discussed with the patient that these lesions possibly represent early shingles.  Patient states she did have a shingles shot in the last few years.  The risks and benefits of antivirals were discussed.  The patient is agreeable with treatment.        Patient's labs from 6/24 demonstrate normal renal function       MDM      Contact dermatitis versus shingles        Medical Decision Making      Disposition and Plan     Clinical Impression:  1. Herpes zoster without complication         Disposition:  Discharge  5/11/2025 11:37 am    Follow-up:  Kyra Ovalle MD  [address] [place] [id number]  [phone]    In 3 days  If symptoms worsen          Medications Prescribed:  Discharge Medication List as of 5/11/2025 11:38 AM        START taking these medications    Details   valACYclovir 1 G Oral Tab Take 1 tablet (1,000 mg total) by mouth 3 (three) times daily for 7 days., Normal, Disp-21 tablet, R-0             Supplementary Documentation:

## 2025-06-23 ENCOUNTER — LAB ENCOUNTER (OUTPATIENT)
Dept: LAB | Age: 80
End: 2025-06-23
Attending: INTERNAL MEDICINE
Payer: MEDICARE

## 2025-06-23 DIAGNOSIS — E05.20 TOXIC MULTINODULAR GOITER: Primary | ICD-10-CM

## 2025-06-23 LAB
T3FREE SERPL-MCNC: 3.24 PG/ML (ref 2.4–4.2)
T4 FREE SERPL-MCNC: 1.2 NG/DL (ref 0.8–1.7)
TSI SER-ACNC: 1.01 UIU/ML (ref 0.55–4.78)

## 2025-06-23 PROCEDURE — 36415 COLL VENOUS BLD VENIPUNCTURE: CPT

## 2025-06-23 PROCEDURE — 84439 ASSAY OF FREE THYROXINE: CPT

## 2025-06-23 PROCEDURE — 84443 ASSAY THYROID STIM HORMONE: CPT

## 2025-06-23 PROCEDURE — 84481 FREE ASSAY (FT-3): CPT

## 2025-07-02 ENCOUNTER — HOSPITAL ENCOUNTER (OUTPATIENT)
Dept: ULTRASOUND IMAGING | Age: 80
Discharge: HOME | End: 2025-07-02
Attending: INTERNAL MEDICINE
Payer: MEDICARE

## 2025-07-02 DIAGNOSIS — E04.2 NONTOXIC MULTINODULAR GOITER: ICD-10-CM

## 2025-07-02 PROCEDURE — 76536 US EXAM OF HEAD AND NECK: CPT | Performed by: INTERNAL MEDICINE

## (undated) DEVICE — 12 ML SYRINGE LUER-LOCK TIP: Brand: MONOJECT

## (undated) DEVICE — CONMED SCOPE SAVER BITE BLOCK, 20X27 MM: Brand: SCOPE SAVER

## (undated) DEVICE — ABDOMINAL PAD: Brand: CURITY

## (undated) DEVICE — CAUTERY BLADE 2IN INS E1455

## (undated) DEVICE — GAMMEX® PI HYBRID SIZE 7, STERILE POWDER-FREE SURGICAL GLOVE, POLYISOPRENE AND NEOPRENE BLEND: Brand: GAMMEX

## (undated) DEVICE — DRAPE TAPE: Brand: CONVERTORS

## (undated) DEVICE — Device: Brand: CUSTOM PROCEDURE KIT

## (undated) DEVICE — YANKAUER SUCTION INSTRUMENT NO CONTROL VENT, BULB TIP, CLEAR: Brand: YANKAUER

## (undated) DEVICE — CANNULA NASAL 02/C02 ADULT

## (undated) DEVICE — MEDI-VAC NON-CONDUCTIVE SUCTION TUBING 6MM X 1.8M (6FT.) L: Brand: CARDINAL HEALTH

## (undated) DEVICE — Device: Brand: DEFENDO AIR/WATER/SUCTION AND BIOPSY VALVE

## (undated) DEVICE — ENCORE® PERRY STYLE 42 PF SIZE 6.5, STERILE LATEX POWDER-FREE SURGICAL GLOVE: Brand: ENCORE

## (undated) DEVICE — COVER PRB NEOGUARD 30X2.6CM US

## (undated) DEVICE — DRAPE PACK CHEST & U BAR

## (undated) DEVICE — FLEXIBLE YANKAUER,MEDIUM TIP, NO VACUUM CONTROL: Brand: ARGYLE

## (undated) DEVICE — VEST SRG 4 LG CUP R/L

## (undated) DEVICE — CONTAINER SPEC STR 4OZ GRY LID

## (undated) DEVICE — SUTURE SILK 2-0 FS

## (undated) DEVICE — SUTURE CHROMIC GUT 3-0 SH

## (undated) DEVICE — MINOR GENERAL: Brand: MEDLINE INDUSTRIES, INC.

## (undated) DEVICE — SUTURE MONOCRYL 4-0 PS-2

## (undated) DEVICE — LINE MNTR ADLT SET O2 INTMD

## (undated) DEVICE — SUTURE VICRYL 3-0 SH

## (undated) DEVICE — MEDI-VAC NON-CONDUCTIVE SUCTION TUBING: Brand: CARDINAL HEALTH

## (undated) NOTE — LETTER
9/22/2020              [address] [facility] records indicate that the tests ordered for you by King Darwin MD  have not been done.   If you have, in fact, already compl

## (undated) NOTE — Clinical Note
MADDIE, pt eligible for TCM until 4/19. She has an appt with on on 5/13 and is unable to come any sooner. TCM template completed.

## (undated) NOTE — LETTER
[address] [place] [address]  Authorization for Invasive Procedures  1.  I hereby authorize Dr. Jessy Patel physician and whomever may be designated as the doctor's assistant, to perform the following operation and/or procedure:  Fine Needle As 5. I consent to the photographing of the operations or procedures to be performed for the purposes of advancing medicine, science, and/or education, provided my identity is not revealed.  If the procedure has been videotaped, the physician/surgeon will obta __________ Time: ___________    Statement of Physician  My signature below affirms that prior to the time of the procedure, I have explained to the patient and/or her legal representative, the risks and benefits involved in the proposed treatment and any r

## (undated) NOTE — LETTER
September 18, 2018         Kb Reyez MD  [address]  [address] [address]      Patient: Jessica Keating   YOB: 1945   Date of Visit: 9/18/2018       Dear Dr. Girish Terrazas MD,    I saw your patient, Jessica Keating, on 9/1

## (undated) NOTE — LETTER
[address], [place], [place]     AUTHORIZATION FOR SURGICAL OPERATION OR PROCEDURE    I hereby authorize Dr. Richelle Kwon, my Physician(s) and whomever may be designated as the doctor's Assistant, to perform the following operatio Physician. 4. I consent to the photographing of procedure(s) to be performed for the purposes of advancing medicine, science and/or education, provided my identity is not revealed.  If the procedure has been videotaped, the physician/surgeon will obtain th (Witness signature)                                                                                                  (Date)                                (Time)  STATEMENT OF PHYSICIAN My signature below affirms that prior to the time of the procedure;  I

## (undated) NOTE — Clinical Note
Thank you for the consult. I saw Ms. Sarah Carlton in the endocrine/diabetes clinic today. Please see attached my note. Please feel free to contact me with any questions. Thanks!